# Patient Record
Sex: FEMALE | Race: BLACK OR AFRICAN AMERICAN | NOT HISPANIC OR LATINO | URBAN - METROPOLITAN AREA
[De-identification: names, ages, dates, MRNs, and addresses within clinical notes are randomized per-mention and may not be internally consistent; named-entity substitution may affect disease eponyms.]

---
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
## 2022-01-01 ENCOUNTER — INPATIENT (INPATIENT)
Facility: HOSPITAL | Age: 22
LOS: 10 days | Discharge: HOME | End: 2022-01-12
Attending: PSYCHIATRY & NEUROLOGY | Admitting: PSYCHIATRY & NEUROLOGY
Payer: COMMERCIAL

## 2022-01-01 VITALS — HEIGHT: 62 IN

## 2022-01-01 DIAGNOSIS — F31.9 BIPOLAR DISORDER, UNSPECIFIED: ICD-10-CM

## 2022-01-01 PROCEDURE — 93010 ELECTROCARDIOGRAM REPORT: CPT

## 2022-01-01 PROCEDURE — 99285 EMERGENCY DEPT VISIT HI MDM: CPT

## 2022-01-01 PROCEDURE — 99285 EMERGENCY DEPT VISIT HI MDM: CPT | Mod: 25

## 2022-01-01 PROCEDURE — 99053 MED SERV 10PM-8AM 24 HR FAC: CPT

## 2022-01-01 RX ORDER — HALOPERIDOL DECANOATE 100 MG/ML
5 INJECTION INTRAMUSCULAR ONCE
Refills: 0 | Status: COMPLETED | OUTPATIENT
Start: 2022-01-01 | End: 2022-01-01

## 2022-01-01 RX ORDER — DIPHENHYDRAMINE HCL 50 MG
50 CAPSULE ORAL EVERY 6 HOURS
Refills: 0 | Status: DISCONTINUED | OUTPATIENT
Start: 2022-01-02 | End: 2022-01-12

## 2022-01-01 RX ORDER — QUETIAPINE FUMARATE 200 MG/1
250 TABLET, FILM COATED ORAL AT BEDTIME
Refills: 0 | Status: DISCONTINUED | OUTPATIENT
Start: 2022-01-01 | End: 2022-01-02

## 2022-01-01 RX ORDER — QUETIAPINE FUMARATE 200 MG/1
250 TABLET, FILM COATED ORAL AT BEDTIME
Refills: 0 | Status: DISCONTINUED | OUTPATIENT
Start: 2022-01-02 | End: 2022-01-03

## 2022-01-01 RX ORDER — DIPHENHYDRAMINE HCL 50 MG
50 CAPSULE ORAL ONCE
Refills: 0 | Status: DISCONTINUED | OUTPATIENT
Start: 2022-01-02 | End: 2022-01-12

## 2022-01-01 RX ORDER — HALOPERIDOL DECANOATE 100 MG/ML
5 INJECTION INTRAMUSCULAR EVERY 6 HOURS
Refills: 0 | Status: DISCONTINUED | OUTPATIENT
Start: 2022-01-02 | End: 2022-01-12

## 2022-01-01 RX ORDER — HALOPERIDOL DECANOATE 100 MG/ML
5 INJECTION INTRAMUSCULAR ONCE
Refills: 0 | Status: DISCONTINUED | OUTPATIENT
Start: 2022-01-02 | End: 2022-01-12

## 2022-01-01 RX ADMIN — HALOPERIDOL DECANOATE 5 MILLIGRAM(S): 100 INJECTION INTRAMUSCULAR at 23:26

## 2022-01-01 RX ADMIN — Medication 50 MILLIGRAM(S): at 23:40

## 2022-01-01 RX ADMIN — Medication 2 MILLIGRAM(S): at 23:26

## 2022-01-01 NOTE — ED BEHAVIORAL HEALTH ASSESSMENT NOTE - PSYCHIATRIC ISSUES AND PLAN (INCLUDE STANDING AND PRN MEDICATION)
continue quetiapine 250 mg QHS, PRN haloperidol/lorazepam/diphenhydramine 5 mg / 2 mg / 50 mg PO/IM for
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
## 2022-01-01 NOTE — ED BEHAVIORAL HEALTH ASSESSMENT NOTE - DETAILS
boarding - will contact e-mailed Dr. Luther 1S anxiety/depression in family boarding Latuda "weight gain" and Zyprexa "I did not like it" Unable to assess, but has no history of suicidality. cortez family is notified
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
## 2022-01-01 NOTE — ED ADULT TRIAGE NOTE - CHIEF COMPLAINT QUOTE
Pt brought in by father for psych eval. Pt uncooperative in triage, wandering around RW and cursing at staff. Security called. Pt brought directly to .    Father: [phone]

## 2022-01-01 NOTE — ED BEHAVIORAL HEALTH ASSESSMENT NOTE - CASE SUMMARY
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    Patient is unable to participate in full interview. However, based on patient's clearly manic presentation (agitated, inappropriate, tangential, hypersexual) as well as collateral report (patient being aggressive and violent towards others, not taking her medications, disorganized), she meets criteria for involuntary hospitalization due to florid hemant and resulting inability to look after self and risk of harm to self and others.

## 2022-01-01 NOTE — ED BEHAVIORAL HEALTH ASSESSMENT NOTE - RISK ASSESSMENT
Low Acute Suicide Risk Protective factors: no history of suicide attempt, supportive family, engaged in school, connected to treatment. Risk factors: hemant, poor judgment, non adherence with medications, impulsivity

## 2022-01-01 NOTE — ED BEHAVIORAL HEALTH ASSESSMENT NOTE - SUMMARY
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    Patient is unable to participate in full interview. However, based on patient's clearly manic presentation (agitated, inappropriate, tangential, hypersexual) as well as collateral report (patient being aggressive and violent towards others, not taking her medications, disorganized), she meets criteria for involuntary hospitalization due to florid hemant and resulting inability to look after self and risk of harm to self and others. 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    Patient is unable to participate in full interview. However, based on patient's clearly manic presentation (agitated, inappropriate, tangential, hypersexual) as well as collateral report (patient being aggressive and violent towards others, not taking her medications, disorganized), she meets criteria for involuntary hospitalization due to florid hemant and resulting inability to look after self and risk of harm to self and others.    Addendum by Dr. Aaron: Pt tested positive for COVID , and was moved to the main ed . pt was seen this morning in the main ED, pt is  on 1:1, she awake and alert  , but is uncooperative but no longer agitated or aggressive . Pt is asking  to have "papers" signed . When asked about papers she states "to get out of here". Pt is observed to be disorganised, pulling things out of the draw, engaged in purposeless activity .  Pt is assigned 1N at [facility] , family is notified.

## 2022-01-01 NOTE — ED BEHAVIORAL HEALTH NOTE - BEHAVIORAL HEALTH NOTE
COVID Exposure Screen- collateral (i.e. third-party, chart review, belongings, etc; include EMS and ED staff)  1.	*Has the patient had a COVID-19 test in the last 90 days?  ( x ) Yes   (  ) No   (  ) Unknown- Reason: _____  IF YES PROCEED TO QUESTION #2. IF NO OR UNKNOWN, PLEASE SKIP TO QUESTION #3.  2.	Date of test(s) and result(s): 12/2/21/negative________  3.	*Has the patient tested positive for COVID-19 antibodies? (x  ) Yes   (  ) No   (  ) Unknown- Reason: _____  IF YES PROCEED TO QUESTION #4. IF NO or UNKNOWN, PLEASE SKIP TO QUESTION #5.  4.	Date of positive antibody test: _December 29_______  5.	*Has the patient received 2 doses of the COVID-19 vaccine? (  ) Yes   ( x ) No   (  ) Unknown- Reason: _____  IF YES PROCEED TO QUESTION #6. IF NO or UNKNOWN, PLEASE SKIP TO QUESTION #7.  6.	 Date of second dose: ________  7.	*In the past 10 days, has the patient been around anyone with a positive COVID-19 test?* (  ) Yes   ( x ) No   (  ) Unknown- Reason: __  IF YES PROCEED TO QUESTION #8. IF NO or UNKNOWN, PLEASE SKIP TO QUESTION #13.  8.	Was the patient within 6 feet of them for at least 15 minutes? (  ) Yes   (  ) No   (  ) Unknown- Reason: _____  9.	Did the patient provide care for them? (  ) Yes   (  ) No   (  ) Unknown- Reason: ______  10.	Did the patient have direct physical contact with them (touched, hugged, or kissed them)? (  ) Yes   (  ) No    (  ) Unknown- Reason: __  11.	Did the patient share eating or drinking utensils with them? (  ) Yes   (  ) No    (  ) Unknown- Reason: ____  12.	Did they sneeze, cough, or somehow get respiratory droplets on the patient? (  ) Yes   (  ) No    (  ) Unknown- Reason: ______  13.	*Has the patient been out of [place] within the past 10 days?* (  ) Yes   ( x ) No   (  ) Unknown- Reason: _____  IF YES PLEASE ANSWER THE FOLLOWING QUESTIONS:  14.	Which state/country did they go to? ______  15.	Were they there over 24 hours? (  ) Yes   (  ) No    (  ) Unknown- Reason: ______  16.	Date of return to [facility]: ______.

## 2022-01-01 NOTE — ED PROVIDER NOTE - COVID-19 RESULT DATE/TIME
29-Dec-2021 02:44 Mohs Method Verbiage: An incision at a 90 degree angle following the standard Mohs approach was done and the specimen was harvested as a microscopic controlled layer.

## 2022-01-01 NOTE — ED BEHAVIORAL HEALTH ASSESSMENT NOTE - OTHER PAST PSYCHIATRIC HISTORY (INCLUDE DETAILS REGARDING ONSET, COURSE OF ILLNESS, INPATIENT/OUTPATIENT TREATMENT)
two prior psych admissions in May 2020 in [place] for manic episode and psychosis, most recently at [facility] on 12/02/21-12/21/21 for hemant.  no past suicidal ideation, no past SA  Currently in treatment at [facility] - had intake. Next appt - Jan 7, 2022  recently seen in ED 12/29

## 2022-01-01 NOTE — ED BEHAVIORAL HEALTH ASSESSMENT NOTE - DESCRIPTION
breast reduction surgery in nursing school, resides with parents, in relationship with someone in [place] Vitals not currently available.  Patient was placed in restraints due to aggression and disorganization. Patient on constant observation in  area.

## 2022-01-01 NOTE — ED PROVIDER NOTE - NSICDXPASTSURGICALHX_GEN_ALL_CORE_FT
NURSE NOTES:

Dressing to the left foot remains intact.no complaint ofd pain.Piccline  double lumen to the 
upper left arm  intact.patient has no complaint of pain.call light within reach. PAST SURGICAL HISTORY:  No significant past surgical history

## 2022-01-01 NOTE — ED ADULT NURSE REASSESSMENT NOTE - NS ED NURSE REASSESS COMMENT FT1
Pt remains aggressive, combative, not cooperating. Pt is not re-directable. Pt placed in 4 point restraints per NP Pellow orders.

## 2022-01-01 NOTE — ED PROVIDER NOTE - CONSTITUTIONAL, MLM
2/22/2021      Vannessa Perez  [address]  [facility] [address]  1937        To Whom it may concern:           Mrs Perez is currently taking several over the counter medications including Miralax, fish oil, calcium, and CoQ10 on a daily basis.  She also monitors her blood sugars 5 times daily with a glucometer, test strips, and lancets.  Mrs Perez also uses incontinent pads on a daily basis.    Thank you    Sincerely:        Fito Galarza MD  [address]  [place] [address]  Phone: [phone]  Fax: [phone]   - - -

## 2022-01-01 NOTE — ED PROVIDER NOTE - CLINICAL SUMMARY MEDICAL DECISION MAKING FREE TEXT BOX
20 y/o  F  hx Bipolar   Labs, Urine Tox/UA, EKG  Medical evaluation performed. There is no clinical evidence of intoxication or any acute medical problem requiring immediate intervention. Patient is awaiting psychiatric consultation. Final disposition will be determined by psychiatrist.

## 2022-01-01 NOTE — ED PROVIDER NOTE - PROGRESS NOTE DETAILS
20 y/o  F  hx Bipolar BIB father secondary to aggression  and bizarre behaviors. Patient  appears paranoid,  presents  yelling , not responding to directions,   expressing a flight of ideas with excessive use of  profanity.  Medication offered. Restraint applied for safety. Constant  observation initiated. Psychiatric consultation called. Scar KUMAR: Pt signed out to me.  She received IM medications for severe agitation on arrival and was placed at the time in 4-point restraints.  Pt is now calm, sleeping comfortably, restraints removed, will cont to observe and reassess. Scar KUMAR: Labs reviewed, covid pcr is positive.  Will bring pt to main ED as she is boarding for appropriate psychiatric bed.

## 2022-01-02 DIAGNOSIS — F31.9 BIPOLAR DISORDER, UNSPECIFIED: ICD-10-CM

## 2022-01-02 LAB
ALBUMIN SERPL ELPH-MCNC: 3.7 G/DL — SIGNIFICANT CHANGE UP (ref 3.3–5)
ALP SERPL-CCNC: 82 U/L — SIGNIFICANT CHANGE UP (ref 40–120)
ALT FLD-CCNC: 15 U/L — SIGNIFICANT CHANGE UP (ref 4–33)
AMPHET UR-MCNC: NEGATIVE — SIGNIFICANT CHANGE UP
ANION GAP SERPL CALC-SCNC: 16 MMOL/L — HIGH (ref 7–14)
ANISOCYTOSIS BLD QL: SLIGHT — SIGNIFICANT CHANGE UP
APAP SERPL-MCNC: <5 UG/ML — LOW (ref 15–25)
APPEARANCE UR: ABNORMAL
AST SERPL-CCNC: 25 U/L — SIGNIFICANT CHANGE UP (ref 4–32)
BACTERIA # UR AUTO: ABNORMAL
BARBITURATES UR SCN-MCNC: NEGATIVE — SIGNIFICANT CHANGE UP
BASOPHILS # BLD AUTO: 0 K/UL — SIGNIFICANT CHANGE UP (ref 0–0.2)
BASOPHILS NFR BLD AUTO: 0 % — SIGNIFICANT CHANGE UP (ref 0–2)
BENZODIAZ UR-MCNC: NEGATIVE — SIGNIFICANT CHANGE UP
BILIRUB SERPL-MCNC: 0.3 MG/DL — SIGNIFICANT CHANGE UP (ref 0.2–1.2)
BILIRUB UR-MCNC: NEGATIVE — SIGNIFICANT CHANGE UP
BUN SERPL-MCNC: 13 MG/DL — SIGNIFICANT CHANGE UP (ref 7–23)
CALCIUM SERPL-MCNC: 8.9 MG/DL — SIGNIFICANT CHANGE UP (ref 8.4–10.5)
CHLORIDE SERPL-SCNC: 103 MMOL/L — SIGNIFICANT CHANGE UP (ref 98–107)
CO2 SERPL-SCNC: 18 MMOL/L — LOW (ref 22–31)
COCAINE METAB.OTHER UR-MCNC: NEGATIVE — SIGNIFICANT CHANGE UP
COLOR SPEC: YELLOW — SIGNIFICANT CHANGE UP
COVID-19 SPIKE DOMAIN AB INTERP: POSITIVE
COVID-19 SPIKE DOMAIN ANTIBODY RESULT: 183 U/ML — HIGH
CREAT SERPL-MCNC: 0.78 MG/DL — SIGNIFICANT CHANGE UP (ref 0.5–1.3)
CREATININE URINE RESULT, DAU: 253 MG/DL — SIGNIFICANT CHANGE UP
DIFF PNL FLD: NEGATIVE — SIGNIFICANT CHANGE UP
EOSINOPHIL # BLD AUTO: 0.05 K/UL — SIGNIFICANT CHANGE UP (ref 0–0.5)
EOSINOPHIL NFR BLD AUTO: 0.9 % — SIGNIFICANT CHANGE UP (ref 0–6)
EPI CELLS # UR: 11 /HPF — HIGH (ref 0–5)
ETHANOL SERPL-MCNC: <10 MG/DL — SIGNIFICANT CHANGE UP
GIANT PLATELETS BLD QL SMEAR: PRESENT — SIGNIFICANT CHANGE UP
GLUCOSE SERPL-MCNC: 82 MG/DL — SIGNIFICANT CHANGE UP (ref 70–99)
GLUCOSE UR QL: NEGATIVE — SIGNIFICANT CHANGE UP
HCG SERPL-ACNC: <5 MIU/ML — SIGNIFICANT CHANGE UP
HCT VFR BLD CALC: 36.7 % — SIGNIFICANT CHANGE UP (ref 34.5–45)
HGB BLD-MCNC: 11.3 G/DL — LOW (ref 11.5–15.5)
HYALINE CASTS # UR AUTO: 3 /LPF — SIGNIFICANT CHANGE UP (ref 0–7)
IANC: 1.43 K/UL — LOW (ref 1.5–8.5)
KETONES UR-MCNC: NEGATIVE — SIGNIFICANT CHANGE UP
LEUKOCYTE ESTERASE UR-ACNC: ABNORMAL
LYMPHOCYTES # BLD AUTO: 1.83 K/UL — SIGNIFICANT CHANGE UP (ref 1–3.3)
LYMPHOCYTES # BLD AUTO: 34.8 % — SIGNIFICANT CHANGE UP (ref 13–44)
MACROCYTES BLD QL: SLIGHT — SIGNIFICANT CHANGE UP
MANUAL SMEAR VERIFICATION: SIGNIFICANT CHANGE UP
MCHC RBC-ENTMCNC: 28.3 PG — SIGNIFICANT CHANGE UP (ref 27–34)
MCHC RBC-ENTMCNC: 30.8 GM/DL — LOW (ref 32–36)
MCV RBC AUTO: 92 FL — SIGNIFICANT CHANGE UP (ref 80–100)
METHADONE UR-MCNC: NEGATIVE — SIGNIFICANT CHANGE UP
MONOCYTES # BLD AUTO: 0.71 K/UL — SIGNIFICANT CHANGE UP (ref 0–0.9)
MONOCYTES NFR BLD AUTO: 13.4 % — SIGNIFICANT CHANGE UP (ref 2–14)
NEUTROPHILS # BLD AUTO: 2.02 K/UL — SIGNIFICANT CHANGE UP (ref 1.8–7.4)
NEUTROPHILS NFR BLD AUTO: 38.4 % — LOW (ref 43–77)
NITRITE UR-MCNC: NEGATIVE — SIGNIFICANT CHANGE UP
OPIATES UR-MCNC: NEGATIVE — SIGNIFICANT CHANGE UP
OXYCODONE UR-MCNC: NEGATIVE — SIGNIFICANT CHANGE UP
PCP SPEC-MCNC: SIGNIFICANT CHANGE UP
PCP UR-MCNC: NEGATIVE — SIGNIFICANT CHANGE UP
PH UR: 6.5 — SIGNIFICANT CHANGE UP (ref 5–8)
PLAT MORPH BLD: NORMAL — SIGNIFICANT CHANGE UP
PLATELET # BLD AUTO: 324 K/UL — SIGNIFICANT CHANGE UP (ref 150–400)
PLATELET COUNT - ESTIMATE: NORMAL — SIGNIFICANT CHANGE UP
POIKILOCYTOSIS BLD QL AUTO: SLIGHT — SIGNIFICANT CHANGE UP
POLYCHROMASIA BLD QL SMEAR: SLIGHT — SIGNIFICANT CHANGE UP
POTASSIUM SERPL-MCNC: 3.5 MMOL/L — SIGNIFICANT CHANGE UP (ref 3.5–5.3)
POTASSIUM SERPL-SCNC: 3.5 MMOL/L — SIGNIFICANT CHANGE UP (ref 3.5–5.3)
PROT SERPL-MCNC: 8.1 G/DL — SIGNIFICANT CHANGE UP (ref 6–8.3)
PROT UR-MCNC: ABNORMAL
RBC # BLD: 3.99 M/UL — SIGNIFICANT CHANGE UP (ref 3.8–5.2)
RBC # FLD: 14.1 % — SIGNIFICANT CHANGE UP (ref 10.3–14.5)
RBC BLD AUTO: ABNORMAL
RBC CASTS # UR COMP ASSIST: SIGNIFICANT CHANGE UP /HPF (ref 0–4)
SALICYLATES SERPL-MCNC: <0.3 MG/DL — LOW (ref 15–30)
SARS-COV-2 IGG+IGM SERPL QL IA: 183 U/ML — HIGH
SARS-COV-2 IGG+IGM SERPL QL IA: POSITIVE
SARS-COV-2 RNA SPEC QL NAA+PROBE: DETECTED
SMUDGE CELLS # BLD: PRESENT — SIGNIFICANT CHANGE UP
SODIUM SERPL-SCNC: 137 MMOL/L — SIGNIFICANT CHANGE UP (ref 135–145)
SP GR SPEC: 1.03 — SIGNIFICANT CHANGE UP (ref 1–1.05)
THC UR QL: POSITIVE
TOXICOLOGY SCREEN, DRUGS OF ABUSE, SERUM RESULT: SIGNIFICANT CHANGE UP
TSH SERPL-MCNC: 9.14 UIU/ML — HIGH (ref 0.5–4.3)
UROBILINOGEN FLD QL: SIGNIFICANT CHANGE UP
VARIANT LYMPHS # BLD: 12.5 % — HIGH (ref 0–6)
WBC # BLD: 5.27 K/UL — SIGNIFICANT CHANGE UP (ref 3.8–10.5)
WBC # FLD AUTO: 5.27 K/UL — SIGNIFICANT CHANGE UP (ref 3.8–10.5)
WBC UR QL: SIGNIFICANT CHANGE UP /HPF (ref 0–5)

## 2022-01-02 PROCEDURE — 99222 1ST HOSP IP/OBS MODERATE 55: CPT

## 2022-01-02 RX ORDER — DIPHENHYDRAMINE HCL 50 MG
50 CAPSULE ORAL ONCE
Refills: 0 | Status: COMPLETED | OUTPATIENT
Start: 2022-01-02 | End: 2022-01-01

## 2022-01-02 RX ORDER — HALOPERIDOL DECANOATE 100 MG/ML
5 INJECTION INTRAMUSCULAR ONCE
Refills: 0 | Status: COMPLETED | OUTPATIENT
Start: 2022-01-02 | End: 2022-01-02

## 2022-01-02 RX ORDER — ACETAMINOPHEN 500 MG
650 TABLET ORAL EVERY 6 HOURS
Refills: 0 | Status: DISCONTINUED | OUTPATIENT
Start: 2022-01-02 | End: 2022-01-12

## 2022-01-02 RX ADMIN — Medication 2 MILLIGRAM(S): at 09:03

## 2022-01-02 RX ADMIN — QUETIAPINE FUMARATE 250 MILLIGRAM(S): 200 TABLET, FILM COATED ORAL at 20:54

## 2022-01-02 RX ADMIN — HALOPERIDOL DECANOATE 5 MILLIGRAM(S): 100 INJECTION INTRAMUSCULAR at 09:03

## 2022-01-02 NOTE — BH INPATIENT PSYCHIATRY ASSESSMENT NOTE - NSBHASSESSSUMMFT_PSY_ALL_CORE
Bipolar Disorder 1, manic with psychosis    PLAN:  --Inpatient hospitalization for acute stabilization and treatment    --seroquel 250mg QHS and titrate as indicated / tolerated    --prn haldol, ativan, benadryl for agitation    --Individual, group, and milieu therapy as tolerated    --Ongoing collaboration with family and outpatient providers.

## 2022-01-02 NOTE — BH INPATIENT PSYCHIATRY ASSESSMENT NOTE - NSBHCHARTREVIEWVS_PSY_A_CORE FT
Vital Signs Last 24 Hrs  T(C): 37.1 (01-02-22 @ 12:00), Max: 37.1 (01-02-22 @ 12:00)  T(F): 98.8 (01-02-22 @ 12:00), Max: 98.8 (01-02-22 @ 12:00)  HR: 79 (01-02-22 @ 12:00) (79 - 90)  BP: 121/82 (01-02-22 @ 12:00) (121/82 - 137/74)  BP(mean): --  RR: 17 (01-02-22 @ 12:00) (17 - 18)  SpO2: 99% (01-02-22 @ 12:00) (99% - 100%)

## 2022-01-02 NOTE — BH INPATIENT PSYCHIATRY ASSESSMENT NOTE - OTHER PAST PSYCHIATRIC HISTORY (INCLUDE DETAILS REGARDING ONSET, COURSE OF ILLNESS, INPATIENT/OUTPATIENT TREATMENT)
two prior psych admissions in May 2020 in [place] for manic episode and psychosis, most recently at [facility] on 12/02/21-12/21/21 for hemant.  no past suicidal ideation, no past SA  Currently in treatment at [facility] - had intake. Next appt - Jan 7, 2022  recently seen in ED 12/29

## 2022-01-02 NOTE — BH PATIENT PROFILE - FALL HARM RISK - UNIVERSAL INTERVENTIONS
Bed in lowest position, wheels locked, appropriate side rails in place/Call bell, personal items and telephone in reach/Instruct patient to call for assistance before getting out of bed or chair/Non-slip footwear when patient is out of bed/[place] to call system/Physically safe environment - no spills, clutter or unnecessary equipment/Purposeful Proactive Rounding/Room/bathroom lighting operational, light cord in reach

## 2022-01-02 NOTE — BH INPATIENT PSYCHIATRY ASSESSMENT NOTE - CURRENT MEDICATION
MEDICATIONS  (STANDING):  QUEtiapine 250 milliGRAM(s) Oral at bedtime    MEDICATIONS  (PRN):  acetaminophen     Tablet .. 650 milliGRAM(s) Oral every 6 hours PRN Temp greater or equal to 38C (100.4F), Mild Pain (1 - 3)  diphenhydrAMINE 50 milliGRAM(s) Oral every 6 hours PRN Rash and/or Itching, agitation  diphenhydrAMINE Injectable 50 milliGRAM(s) IntraMuscular once PRN agitation  haloperidol     Tablet 5 milliGRAM(s) Oral every 6 hours PRN agitation  haloperidol    Injectable 5 milliGRAM(s) IntraMuscular Once PRN agitation  LORazepam     Tablet 2 milliGRAM(s) Oral every 6 hours PRN Agitation  LORazepam   Injectable 2 milliGRAM(s) IntraMuscular Once PRN Agitation

## 2022-01-02 NOTE — ED ADULT NURSE NOTE - DOES PATIENT HAVE ADVANCE DIRECTIVE
Pt received into bay 6 from OR. Pt with oral airway, non responsive to verbal stimuli. O2 at 6L/simple mask. resp easy, unlabored. Report obtained. Right foot foot dressing with Ace bandage. Clean/dry/intact. Elevated with pillow. Ice pack applied to site. Pt appears comfortable. Will closely monitor at bedside while oral airway in place. No

## 2022-01-02 NOTE — ED ADULT NURSE NOTE - CHIEF COMPLAINT QUOTE
Pt brought in by father for psych eval. Pt uncooperative in triage, wandering around RW and cursing at staff. Security called. Pt brought directly to .    Father: [phone]

## 2022-01-02 NOTE — BH INPATIENT PSYCHIATRY ASSESSMENT NOTE - DETAILS
Latuda "weight gain" and Zyprexa "I did not like it" Unable to assess, but has no history of suicidality. anxiety/depression in family

## 2022-01-02 NOTE — BH INPATIENT PSYCHIATRY ASSESSMENT NOTE - NSBHMETABOLIC_PSY_ALL_CORE_FT
BMI: BMI (kg/m2): 29.6 (01-01-22 @ 22:50)  HbA1c:   Glucose:   BP: 121/82 (01-02-22 @ 12:00) (121/82 - 137/74)  Lipid Panel:

## 2022-01-02 NOTE — ED ADULT NURSE REASSESSMENT NOTE - NS ED NURSE REASSESS COMMENT FT1
Report given to red 2 rn. Pt moved to main room 5. Respirations even and unlabored, no accessory muscle use.
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
## 2022-01-02 NOTE — BH INPATIENT PSYCHIATRY ASSESSMENT NOTE - MSE UNSTRUCTURED FT
Patient is sedated, slurring words, sluggish, struggling to keep eyes open, unable to engage in meaningful conversation, states she feels "fine" and denies SI/HI/AVH.  I&J poor.

## 2022-01-02 NOTE — BH PATIENT PROFILE - HOME MEDICATIONS
SEROquel 100 mg oral tablet , 1 tab(s) orally once a day (at bedtime) MDD:250mg  QUEtiapine 50 mg oral tablet , 3 tab(s) orally once a day (at bedtime)  Multiple Vitamins oral tablet , 1 tab(s) orally once a day

## 2022-01-02 NOTE — ED ADULT NURSE REASSESSMENT NOTE - NS ED NURSE REASSESS COMMENT FT1
[facility] here to take patient, pt transferred w. her belongings. Paperwork given to EMS. [facility] here to take patient, pt transferred w. her belongings. Paperwork given to EMS. VS stable at time of discharge.

## 2022-01-02 NOTE — ED ADULT NURSE REASSESSMENT NOTE - NS ED NURSE REASSESS COMMENT FT1
Pt released from 4 point restraints at this time. Respirations even and unlabored, no accessory muscle use. Skin intact, no injuries noted.

## 2022-01-02 NOTE — ED ADULT NURSE REASSESSMENT NOTE - NS ED NURSE REASSESS COMMENT FT1
Pt sleeping in bed. Respirations even and unlabored, no accessory muscle use. NAD noted at this time. Will continue to monitor.
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
## 2022-01-03 PROCEDURE — 99232 SBSQ HOSP IP/OBS MODERATE 35: CPT

## 2022-01-03 RX ORDER — NICOTINE POLACRILEX 2 MG
2 GUM BUCCAL
Refills: 0 | Status: DISCONTINUED | OUTPATIENT
Start: 2022-01-03 | End: 2022-01-03

## 2022-01-03 RX ORDER — NICOTINE POLACRILEX 2 MG
2 GUM BUCCAL
Refills: 0 | Status: DISCONTINUED | OUTPATIENT
Start: 2022-01-03 | End: 2022-01-12

## 2022-01-03 RX ORDER — LITHIUM CARBONATE 300 MG/1
450 TABLET, EXTENDED RELEASE ORAL AT BEDTIME
Refills: 0 | Status: DISCONTINUED | OUTPATIENT
Start: 2022-01-03 | End: 2022-01-04

## 2022-01-03 RX ORDER — QUETIAPINE FUMARATE 200 MG/1
300 TABLET, FILM COATED ORAL AT BEDTIME
Refills: 0 | Status: DISCONTINUED | OUTPATIENT
Start: 2022-01-03 | End: 2022-01-05

## 2022-01-03 RX ADMIN — Medication 650 MILLIGRAM(S): at 20:46

## 2022-01-03 RX ADMIN — Medication 650 MILLIGRAM(S): at 21:37

## 2022-01-03 RX ADMIN — Medication 2 MILLIGRAM(S): at 12:44

## 2022-01-03 RX ADMIN — QUETIAPINE FUMARATE 300 MILLIGRAM(S): 200 TABLET, FILM COATED ORAL at 20:46

## 2022-01-03 RX ADMIN — Medication 1 TABLET(S): at 11:22

## 2022-01-03 RX ADMIN — Medication 50 MILLIGRAM(S): at 21:59

## 2022-01-03 NOTE — PSYCHIATRIC REHAB INITIAL EVALUATION - NSBHALCSUBTREAT_PSY_ALL_CORE
Pt has psychiatric history of hemant and psychosis with 3 hospitalizations, last hospitalization was at [facility] 12/2-12/21.

## 2022-01-03 NOTE — BH INPATIENT PSYCHIATRY PROGRESS NOTE - NSBHASSESSSUMMFT_PSY_ALL_CORE
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   Bipolar Disorder 1, manic with psychosis  Currently grandiose, distractible, increased energy, psychomotor agitation, intrusive and disruptive on the unit, poor insight into illness. Seroquel 150mg had been well tolerated on prior admission, will plan to increase dose.  Continued inpatient admission required for safety, stabilization, medication optimization, safe discharge planning.    PLAN:    1. Legal: Admitted on 9.39 status  2. Safety: No reported SI/SIB/HI/VI currently on unit; continue routine observation.   - PRNs: ativan 2mg PO/IM qh6, haldol 5mg PO/IM q6h, benadryl 50 PO/IM q6h all PRN for agitation  3. Psychiatric:   - INCREASE seroquel to 300mg PO qhs --> plan to titrate up as tolerated  - START Lithium 450mg PO qhs --> plan to titrate up  4. Therapy: group & milieu therapy  5. Medical: Obtain metabolic labs given pt on antipsychotics: A1C with glucose, lipid panel  6. Collateral: Collateral from father obtained c/w manic episode  7. Disposition: When stable (include possible disposition plans when known)
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
## 2022-01-03 NOTE — PSYCHIATRIC REHAB INITIAL EVALUATION - NSBHPRRECOMMEND_PSY_ALL_CORE
Writer met with patient in order to orient patient to unit, orient patient to COVID-19 precautions, and introduce patient to psychiatric rehabilitation staff and department functions. Due to severity of patient’s sxs, pt was minimally engaged. Pt was irritable and disorganized upon approach. Pt was observed singing and talking loudly on the unit. Therefore, the admission information was obtained from patient’s chart. Pt was brought to ED by her parents for manic episode. Pt was aggressive towards family members and refused taking medications at home. Pt was sexually provocative, agitated and received IM in ED.  Writer and patient were unable to establish a collaborative psychiatric rehabilitation goal, therefore one will be chosen for her. Psychiatric Rehabilitation staff will continue to engage patient daily in order to develop therapeutic rapport. In response to COVID19, unit programming will be re-evaluated on a consistent basis in effort to maintain safety guidelines.

## 2022-01-04 LAB
A1C WITH ESTIMATED AVERAGE GLUCOSE RESULT: 5.3 % — SIGNIFICANT CHANGE UP (ref 4–5.6)
CHOLEST SERPL-MCNC: 142 MG/DL — SIGNIFICANT CHANGE UP
ESTIMATED AVERAGE GLUCOSE: 105 — SIGNIFICANT CHANGE UP
HDLC SERPL-MCNC: 51 MG/DL — SIGNIFICANT CHANGE UP
LIPID PNL WITH DIRECT LDL SERPL: 79 MG/DL — SIGNIFICANT CHANGE UP
NON HDL CHOLESTEROL: 91 MG/DL — SIGNIFICANT CHANGE UP
TRIGL SERPL-MCNC: 61 MG/DL — SIGNIFICANT CHANGE UP

## 2022-01-04 PROCEDURE — 99232 SBSQ HOSP IP/OBS MODERATE 35: CPT

## 2022-01-04 RX ORDER — LORATADINE 10 MG/1
10 TABLET ORAL DAILY
Refills: 0 | Status: DISCONTINUED | OUTPATIENT
Start: 2022-01-04 | End: 2022-01-12

## 2022-01-04 RX ORDER — LIDOCAINE 4 G/100G
1 CREAM TOPICAL ONCE
Refills: 0 | Status: COMPLETED | OUTPATIENT
Start: 2022-01-04 | End: 2022-01-04

## 2022-01-04 RX ORDER — SODIUM CHLORIDE 0.65 %
1 AEROSOL, SPRAY (ML) NASAL
Refills: 0 | Status: DISCONTINUED | OUTPATIENT
Start: 2022-01-04 | End: 2022-01-12

## 2022-01-04 RX ORDER — BENZOCAINE AND MENTHOL 5; 1 G/100ML; G/100ML
1 LIQUID ORAL
Refills: 0 | Status: DISCONTINUED | OUTPATIENT
Start: 2022-01-04 | End: 2022-01-12

## 2022-01-04 RX ORDER — MEN-PHOR .5; .5 G/G; G/G
1 LOTION TOPICAL ONCE
Refills: 0 | Status: DISCONTINUED | OUTPATIENT
Start: 2022-01-04 | End: 2022-01-04

## 2022-01-04 RX ORDER — TETRAHYDROZOLINE/POLYETHYL GLY 0.05 %-1 %
1 DROPS OPHTHALMIC (EYE)
Refills: 0 | Status: DISCONTINUED | OUTPATIENT
Start: 2022-01-04 | End: 2022-01-12

## 2022-01-04 RX ADMIN — Medication 50 MILLIGRAM(S): at 09:38

## 2022-01-04 RX ADMIN — Medication 650 MILLIGRAM(S): at 14:56

## 2022-01-04 RX ADMIN — BENZOCAINE AND MENTHOL 1 LOZENGE: 5; 1 LIQUID ORAL at 14:56

## 2022-01-04 RX ADMIN — Medication 1 DROP(S): at 17:41

## 2022-01-04 RX ADMIN — LIDOCAINE 1 PATCH: 4 CREAM TOPICAL at 18:24

## 2022-01-04 RX ADMIN — Medication 1 SPRAY(S): at 17:41

## 2022-01-04 RX ADMIN — Medication 1 TABLET(S): at 08:24

## 2022-01-04 RX ADMIN — LORATADINE 10 MILLIGRAM(S): 10 TABLET ORAL at 17:41

## 2022-01-04 RX ADMIN — Medication 2 MILLIGRAM(S): at 09:31

## 2022-01-04 RX ADMIN — Medication 2 MILLIGRAM(S): at 10:49

## 2022-01-04 RX ADMIN — HALOPERIDOL DECANOATE 5 MILLIGRAM(S): 100 INJECTION INTRAMUSCULAR at 10:48

## 2022-01-04 RX ADMIN — QUETIAPINE FUMARATE 300 MILLIGRAM(S): 200 TABLET, FILM COATED ORAL at 21:15

## 2022-01-04 RX ADMIN — Medication 2 MILLIGRAM(S): at 14:56

## 2022-01-04 NOTE — BH INPATIENT PSYCHIATRY PROGRESS NOTE - NSBHFUPINTERVALHXFT_PSY_A_CORE
Chart reviewed. Case discussed with interdisciplinary team. Patient seen and examined for follow up of hemant. No acute overnight events. Refused lithium last night. Required PRN ativan and haldol PO this morning for agitation. Per nursing she is irritable, demanding, but quieter than yesterday.     Patient seen this morning in day room by writer. Pt is irritable, hostile, guarded. States she is in the hospital because "my father drove me here and he is incompetent and needs help himself." States she has mood swings "but that's just me." She is overly inclusive and difficult to interrupt, interrupts interviewers. States she is "not a dog and not a lab rat" and that she will not take lithium. States that she is stable and does not need hospitalization, "it's just that I have cats around me holding on like I'm a rag doll." She becomes increasingly oppositional during interview, stating "I'm coming to the point where I want to curse you out."    Denies SI or HI. Denies AVH.

## 2022-01-04 NOTE — BH INPATIENT PSYCHIATRY PROGRESS NOTE - NSBHASSESSSUMMFT_PSY_ALL_CORE
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   Bipolar Disorder 1, manic with psychosis  Currently grandiose, distractible, increased energy, psychomotor agitation, intrusive and disruptive on the unit, poor insight into illness. Seroquel 150mg had been well tolerated on prior admission, will plan to increase dose.  Continued inpatient admission required for safety, stabilization, medication optimization, safe discharge planning.    1/4: Manic symptoms persist, irritable and grandiose, oppositional, pressured, interrupting denying that she is in a manic episode or that she requires hospitalization. Refuses lithium, will dc.     PLAN:    1. Legal: Admitted on 9.39 status  2. Safety: No reported SI/SIB/HI/VI currently on unit; continue routine observation.   - PRNs: ativan 2mg PO/IM qh6, haldol 5mg PO/IM q6h, benadryl 50 PO/IM q6h all PRN for agitation  3. Psychiatric:   - Cotin[place] seroquel 300mg PO qhs --> plan to titrate up as tolerated  - STOP Lithium --> patient refuses  4. Therapy: group & milieu therapy  5. Medical: no current medical issues.  6. Collateral: Collateral from father obtained c/w manic episode  7. Disposition: When stable (include possible disposition plans when known)

## 2022-01-05 PROCEDURE — 99232 SBSQ HOSP IP/OBS MODERATE 35: CPT

## 2022-01-05 RX ORDER — QUETIAPINE FUMARATE 200 MG/1
400 TABLET, FILM COATED ORAL AT BEDTIME
Refills: 0 | Status: DISCONTINUED | OUTPATIENT
Start: 2022-01-05 | End: 2022-01-07

## 2022-01-05 RX ORDER — LIDOCAINE 4 G/100G
1 CREAM TOPICAL DAILY
Refills: 0 | Status: DISCONTINUED | OUTPATIENT
Start: 2022-01-05 | End: 2022-01-12

## 2022-01-05 RX ADMIN — Medication 1 DROP(S): at 17:20

## 2022-01-05 RX ADMIN — HALOPERIDOL DECANOATE 5 MILLIGRAM(S): 100 INJECTION INTRAMUSCULAR at 09:39

## 2022-01-05 RX ADMIN — Medication 2 MILLIGRAM(S): at 10:31

## 2022-01-05 RX ADMIN — Medication 2 MILLIGRAM(S): at 09:39

## 2022-01-05 RX ADMIN — Medication 1 SPRAY(S): at 08:39

## 2022-01-05 RX ADMIN — Medication 50 MILLIGRAM(S): at 17:38

## 2022-01-05 RX ADMIN — LIDOCAINE 1 PATCH: 4 CREAM TOPICAL at 11:34

## 2022-01-05 RX ADMIN — Medication 1 TABLET(S): at 08:39

## 2022-01-05 RX ADMIN — QUETIAPINE FUMARATE 400 MILLIGRAM(S): 200 TABLET, FILM COATED ORAL at 20:51

## 2022-01-05 RX ADMIN — Medication 1 DROP(S): at 08:39

## 2022-01-05 RX ADMIN — Medication 650 MILLIGRAM(S): at 09:38

## 2022-01-05 RX ADMIN — Medication 1 SPRAY(S): at 17:20

## 2022-01-05 RX ADMIN — LORATADINE 10 MILLIGRAM(S): 10 TABLET ORAL at 08:39

## 2022-01-05 NOTE — BH INPATIENT PSYCHIATRY PROGRESS NOTE - NSBHASSESSSUMMFT_PSY_ALL_CORE
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   Bipolar Disorder 1, manic with psychosis  Currently grandiose, distractible, increased energy, psychomotor agitation, intrusive and disruptive on the unit, poor insight into illness. Seroquel 150mg had been well tolerated on prior admission, will plan to increase dose.  Continued inpatient admission required for safety, stabilization, medication optimization, safe discharge planning.    1/4: Manic symptoms persist, irritable and grandiose, oppositional, pressured, interrupting denying that she is in a manic episode or that she requires hospitalization. Refuses lithium, will dc.   1/5 remains floridly manic  PLAN:    1. Legal: Admitted on 9.39 status  2. Safety: No reported SI/SIB/HI/VI currently on unit; continue routine observation.   - PRNs: ativan 2mg PO/IM qh6, haldol 5mg PO/IM q6h, benadryl 50 PO/IM q6h all PRN for agitation  3. Psychiatric:   - Cotinue seroquel increase to 400mg PO qhs --> plan to titrate up as tolerated  - STOP Lithium --> patient refuses  4. Therapy: group & milieu therapy  5. Medical: no current medical issues.  6. Collateral: Collateral from father obtained c/w manic episode  7. Disposition: When stable (include possible disposition plans when known)

## 2022-01-05 NOTE — BH INPATIENT PSYCHIATRY PROGRESS NOTE - NSBHFUPINTERVALHXFT_PSY_A_CORE
Patient seen for follow up of hemant with psychosis  Chart reviewed and case discussed with interdisciplinary team.  Remains manic with  irritability, hostility and paranoia  "I'm here because of a [place] conspiracy to make Covid patients to hide the opiate epidemic"  She continues with flight of ideas  Leaves interview room when attempt is made to clarify her symptoms

## 2022-01-06 PROCEDURE — 99232 SBSQ HOSP IP/OBS MODERATE 35: CPT

## 2022-01-06 RX ORDER — IBUPROFEN 200 MG
600 TABLET ORAL EVERY 6 HOURS
Refills: 0 | Status: DISCONTINUED | OUTPATIENT
Start: 2022-01-06 | End: 2022-01-12

## 2022-01-06 RX ADMIN — QUETIAPINE FUMARATE 400 MILLIGRAM(S): 200 TABLET, FILM COATED ORAL at 23:12

## 2022-01-06 RX ADMIN — LIDOCAINE 1 PATCH: 4 CREAM TOPICAL at 23:37

## 2022-01-06 RX ADMIN — Medication 1 SPRAY(S): at 23:39

## 2022-01-06 RX ADMIN — HALOPERIDOL DECANOATE 5 MILLIGRAM(S): 100 INJECTION INTRAMUSCULAR at 09:05

## 2022-01-06 RX ADMIN — Medication 2 MILLIGRAM(S): at 09:05

## 2022-01-06 RX ADMIN — Medication 600 MILLIGRAM(S): at 14:16

## 2022-01-06 RX ADMIN — Medication 1 TABLET(S): at 09:04

## 2022-01-06 NOTE — BH TREATMENT PLAN - NSTXMEDICINTERPR_PSY_ALL_CORE
Per EMR, pt with continued elevated FiO2 and not appropriate for PT per rehab protocol. Per RN - recommend no PT evaluation until Monday 7/1/19 if appropriate. Will continue to follow pt. Patient will benefit from demonstrating medication compliance and engaging in individual and group sessions in order to identify benefits of compliance for improved symptom management and sustained recovery by day seven. Psychiatric Rehabilitation staff will engage patient daily in order to assist patient in exploring techniques to ensure better compliance.

## 2022-01-06 NOTE — CHART NOTE - NSCHARTNOTEFT_GEN_A_CORE
Asked to examine patient for surgical wound drainage. Patient reports receiving breast reduction surgery 8 weeks prior. Since that time has intermitted drainage from surgical wound. No nipple discharge. Describes the discharge as brownish, no purulence, no bright red blood. On examination w/ supervision by [facility] surgical wound healing w/o dehiscence, purulence, bleeding. Recommend pain control w/ tylenol 975 mg q6 hrs as needed and follow up with outpatient plastic surgeon on discharge.

## 2022-01-06 NOTE — BH TREATMENT PLAN - NSTXMEDICINTERSW_PSY_ALL_CORE
SW reviewed EMR and met with team to gain collateral information regarding pt . SW attempted to meet with pt, pt unwilling to meet, no consent given.

## 2022-01-06 NOTE — BH INPATIENT PSYCHIATRY PROGRESS NOTE - NSBHFUPINTERVALHXFT_PSY_A_CORE
Chart reviewed. Case discussed with interdisciplinary team. Patient seen and examined for follow up of hemant. No acute overnight events. Compliant with standing medication. Used PRN haloperidol, diphenhydramine, and lorazepam for agitation yesterday, also multiple medical PRNs: cepacol, loratidine, acetominaophen, lidocaine, eye drops, nasal spray. Per sleep log, fair sleep.     Patient seen this morning in day room by writer and treatment team. Pt reports that she feels "fine" and requests discharge. Reports speaking to family who feel she is doing better, patient agrees and attributes improvement to medication. Reports fair sleep. Somewhat guarded, answers most questions briefly, though intermittently interrupts interviewers. 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    Denies AVH. Denies SI/HI.    Patient reports BL breast pain and bloody discharge s/p breast reduction surgery several weeks ago. Reports concern for developing UTI due to being on her menstrual cycle, denies dysuria. Agreeable to being seen by hospitalist consultant.    Per collateral from sister with consent from patient:  Affirms that patient has been very manic at home, family cannot manage, sister in support of admission until symptoms improved.  First manic episode patient was psychotic and disorganized.  Sister affirms she is patient's the legal guardian.  Med History:  after first hospitalization, was put on zyprexa, which she thought made her thinking slow and speech slurred  then switched to abilify, but she became depressed and didn't do well in school  then switch to latuda and prozac, "latuda was the best medication, she was her normal self" but patient dc'd due to side effect of nausea, though sister thinks element of rejecting diagnosis also contributed  then prozac alone was continued until manic episode nov. 2021

## 2022-01-06 NOTE — BH INPATIENT PSYCHIATRY PROGRESS NOTE - NSBHASSESSSUMMFT_PSY_ALL_CORE
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   Bipolar Disorder 1, manic with psychosis  Currently grandiose, distractible, increased energy, psychomotor agitation, intrusive and disruptive on the unit, poor insight into illness. Seroquel 150mg had been well tolerated on prior admission, will plan to increase dose.  Continued inpatient admission required for safety, stabilization, medication optimization, safe discharge planning.    1/4: Manic symptoms persist, irritable and grandiose, oppositional, pressured, interrupting denying that she is in a manic episode or that she requires hospitalization. Refuses lithium, will dc.   1/5 remains floridly manic  1/6: Manic symptoms significantly improved. Patient no longer hyperverbal, less irritable. Willing to engage with interview and discuss medicationoptions. Will continue quetiapine.    PLAN:  1. Legal: Admitted on 9.39 status  2. Safety: No reported SI/SIB/HI/VI currently on unit; continue routine observation.   - PRNs: ativan 2mg PO/IM qh6, haldol 5mg PO/IM q6h, benadryl 50 PO/IM q6h all PRN for agitation  3. Psychiatric:   - Cotinue seroquel increase to 400mg PO qhs --> plan to titrate up as tolerated  - STOP Lithium --> patient refuses  4. Therapy: group & milieu therapy  5. Medical: no current medical issues.  6. Collateral: Collateral from father obtained c/w manic episode  7. Disposition: When stable (include possible disposition plans when known)

## 2022-01-07 DIAGNOSIS — U07.1 COVID-19: ICD-10-CM

## 2022-01-07 DIAGNOSIS — Z98.890 OTHER SPECIFIED POSTPROCEDURAL STATES: ICD-10-CM

## 2022-01-07 LAB
APPEARANCE UR: CLEAR — SIGNIFICANT CHANGE UP
BACTERIA # UR AUTO: NEGATIVE — SIGNIFICANT CHANGE UP
BILIRUB UR-MCNC: NEGATIVE — SIGNIFICANT CHANGE UP
COLOR SPEC: YELLOW — SIGNIFICANT CHANGE UP
DIFF PNL FLD: NEGATIVE — SIGNIFICANT CHANGE UP
EPI CELLS # UR: 3 /HPF — SIGNIFICANT CHANGE UP (ref 0–5)
GLUCOSE UR QL: NEGATIVE — SIGNIFICANT CHANGE UP
HYALINE CASTS # UR AUTO: 0 /LPF — SIGNIFICANT CHANGE UP (ref 0–7)
KETONES UR-MCNC: NEGATIVE — SIGNIFICANT CHANGE UP
LEUKOCYTE ESTERASE UR-ACNC: ABNORMAL
NITRITE UR-MCNC: NEGATIVE — SIGNIFICANT CHANGE UP
PH UR: 7.5 — SIGNIFICANT CHANGE UP (ref 5–8)
PROT UR-MCNC: ABNORMAL
RBC CASTS # UR COMP ASSIST: 1 /HPF — SIGNIFICANT CHANGE UP (ref 0–4)
SP GR SPEC: 1.02 — SIGNIFICANT CHANGE UP (ref 1–1.05)
UROBILINOGEN FLD QL: SIGNIFICANT CHANGE UP
WBC UR QL: 3 /HPF — SIGNIFICANT CHANGE UP (ref 0–5)

## 2022-01-07 PROCEDURE — 99232 SBSQ HOSP IP/OBS MODERATE 35: CPT

## 2022-01-07 RX ORDER — QUETIAPINE FUMARATE 200 MG/1
500 TABLET, FILM COATED ORAL AT BEDTIME
Refills: 0 | Status: DISCONTINUED | OUTPATIENT
Start: 2022-01-07 | End: 2022-01-12

## 2022-01-07 RX ADMIN — Medication 50 MILLIGRAM(S): at 21:44

## 2022-01-07 RX ADMIN — LORATADINE 10 MILLIGRAM(S): 10 TABLET ORAL at 10:06

## 2022-01-07 RX ADMIN — QUETIAPINE FUMARATE 500 MILLIGRAM(S): 200 TABLET, FILM COATED ORAL at 20:43

## 2022-01-07 RX ADMIN — HALOPERIDOL DECANOATE 5 MILLIGRAM(S): 100 INJECTION INTRAMUSCULAR at 10:05

## 2022-01-07 RX ADMIN — BENZOCAINE AND MENTHOL 1 LOZENGE: 5; 1 LIQUID ORAL at 21:02

## 2022-01-07 RX ADMIN — BENZOCAINE AND MENTHOL 1 LOZENGE: 5; 1 LIQUID ORAL at 10:06

## 2022-01-07 RX ADMIN — Medication 1 TABLET(S): at 08:38

## 2022-01-07 RX ADMIN — Medication 2 MILLIGRAM(S): at 21:03

## 2022-01-07 RX ADMIN — Medication 650 MILLIGRAM(S): at 13:50

## 2022-01-07 RX ADMIN — Medication 2 MILLIGRAM(S): at 10:06

## 2022-01-07 NOTE — BH INPATIENT PSYCHIATRY PROGRESS NOTE - NSBHFUPINTERVALHXFT_PSY_A_CORE
Patient seen for follow up of Bipolar Rach   Chart reviewed and case discussed with interdisciplinary team.  Compliant with standing medication.  Reports feeling that she is improving  Focused on discharge and refusing PHP  Her family, her team here and her prior team at [facility] all feel she needs PHP  Patient becomes irritable and ends the meeting early

## 2022-01-07 NOTE — BH INPATIENT PSYCHIATRY PROGRESS NOTE - NSBHASSESSSUMMFT_PSY_ALL_CORE
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   Bipolar Disorder 1, manic with psychosis  Currently grandiose, distractible, increased energy, psychomotor agitation, intrusive and disruptive on the unit, poor insight into illness. Seroquel 150mg had been well tolerated on prior admission, will plan to increase dose.  Continued inpatient admission required for safety, stabilization, medication optimization, safe discharge planning.    1/4: Manic symptoms persist, irritable and grandiose, oppositional, pressured, interrupting denying that she is in a manic episode or that she requires hospitalization. Refuses lithium, will dc.   1/5 remains floridly manic  1/6: Manic symptoms significantly improved. Patient no longer hyperverbal, less irritable. Willing to engage with interview and discuss medication options. Will continue quetiapine.   1/7 More manic again after some improvement yesterday  PLAN:  1. Legal: Admitted on 9.39 status  2. Safety: No reported SI/SIB/HI/VI currently on unit; continue routine observation.   - PRNs: ativan 2mg PO/IM qh6, haldol 5mg PO/IM q6h, benadryl 50 PO/IM q6h all PRN for agitation  3. Psychiatric:   - Cotinue seroquel increase to 500 mg PO qhs --> plan to titrate up as tolerated  - STOP Lithium --> patient refuses  4. Therapy: group & milieu therapy  5. Medical: no current medical issues.  6. Collateral: Collateral from father obtained c/w manic episode  7. Disposition: When stable (include possible disposition plans when known)

## 2022-01-08 RX ADMIN — QUETIAPINE FUMARATE 500 MILLIGRAM(S): 200 TABLET, FILM COATED ORAL at 20:48

## 2022-01-08 RX ADMIN — Medication 2 MILLIGRAM(S): at 09:18

## 2022-01-08 RX ADMIN — Medication 50 MILLIGRAM(S): at 22:37

## 2022-01-08 RX ADMIN — HALOPERIDOL DECANOATE 5 MILLIGRAM(S): 100 INJECTION INTRAMUSCULAR at 09:19

## 2022-01-08 RX ADMIN — Medication 2 MILLIGRAM(S): at 08:30

## 2022-01-08 RX ADMIN — Medication 50 MILLIGRAM(S): at 09:19

## 2022-01-08 RX ADMIN — Medication 1 TABLET(S): at 08:30

## 2022-01-08 RX ADMIN — BENZOCAINE AND MENTHOL 1 LOZENGE: 5; 1 LIQUID ORAL at 06:46

## 2022-01-08 RX ADMIN — LORATADINE 10 MILLIGRAM(S): 10 TABLET ORAL at 08:30

## 2022-01-09 RX ADMIN — LIDOCAINE 1 PATCH: 4 CREAM TOPICAL at 21:39

## 2022-01-09 RX ADMIN — HALOPERIDOL DECANOATE 5 MILLIGRAM(S): 100 INJECTION INTRAMUSCULAR at 11:01

## 2022-01-09 RX ADMIN — Medication 650 MILLIGRAM(S): at 11:00

## 2022-01-09 RX ADMIN — LORATADINE 10 MILLIGRAM(S): 10 TABLET ORAL at 10:59

## 2022-01-09 RX ADMIN — BENZOCAINE AND MENTHOL 1 LOZENGE: 5; 1 LIQUID ORAL at 10:59

## 2022-01-09 RX ADMIN — Medication 2 MILLIGRAM(S): at 10:59

## 2022-01-09 RX ADMIN — Medication 1 TABLET(S): at 10:59

## 2022-01-09 RX ADMIN — QUETIAPINE FUMARATE 500 MILLIGRAM(S): 200 TABLET, FILM COATED ORAL at 21:38

## 2022-01-09 RX ADMIN — Medication 2 MILLIGRAM(S): at 11:01

## 2022-01-09 RX ADMIN — Medication 50 MILLIGRAM(S): at 11:00

## 2022-01-09 RX ADMIN — Medication 50 MILLIGRAM(S): at 21:48

## 2022-01-10 PROCEDURE — 99231 SBSQ HOSP IP/OBS SF/LOW 25: CPT

## 2022-01-10 RX ADMIN — Medication 600 MILLIGRAM(S): at 00:29

## 2022-01-10 RX ADMIN — Medication 1 TABLET(S): at 08:30

## 2022-01-10 RX ADMIN — BENZOCAINE AND MENTHOL 1 LOZENGE: 5; 1 LIQUID ORAL at 08:30

## 2022-01-10 RX ADMIN — Medication 50 MILLIGRAM(S): at 12:47

## 2022-01-10 RX ADMIN — Medication 600 MILLIGRAM(S): at 00:05

## 2022-01-10 RX ADMIN — BENZOCAINE AND MENTHOL 1 LOZENGE: 5; 1 LIQUID ORAL at 21:26

## 2022-01-10 RX ADMIN — Medication 650 MILLIGRAM(S): at 16:55

## 2022-01-10 RX ADMIN — Medication 2 MILLIGRAM(S): at 08:30

## 2022-01-10 RX ADMIN — Medication 650 MILLIGRAM(S): at 08:30

## 2022-01-10 RX ADMIN — QUETIAPINE FUMARATE 500 MILLIGRAM(S): 200 TABLET, FILM COATED ORAL at 21:26

## 2022-01-10 RX ADMIN — LORATADINE 10 MILLIGRAM(S): 10 TABLET ORAL at 08:30

## 2022-01-10 NOTE — BH INPATIENT PSYCHIATRY PROGRESS NOTE - NSBHFUPINTERVALHXFT_PSY_A_CORE
Chart reviewed. Case discussed with interdisciplinary team. Patient seen and examined for follow up of hemant. No acute overnight events. Compliant with standing medication. Required PRN haloperidol and lorazepam PO yesterday for agitation. Per sleep log, fair sleep.     Patient seen this morning in day room by writer and treatment team. Pt is initially guarded and reserved, but soon becomes tearful, stating she feels like nobody listens to her, nobody understands her, everyone blames her for everything that goes wrong, and no matter how hard she tries it feels like she never wins. She talks about how she never asks for help because she both feels that she is perfectly capable, but also because it validates all the bad things that people think about her. She is hopeless about the direction of her life because she is "in and out of the hospital" and is both eager for discharge but also afraid that when she goes home she will "isolate" again. She endorses feeling frustrated, denies feeling depressed. Denies SI/HI. Denies AVH.     Good appetite and PO intake. Sleeping well. No side effects to medications reported.  Chart reviewed. Case discussed with interdisciplinary team. Patient seen and examined for follow up of hemant. No acute overnight events. Compliant with standing medication. Required PRN haloperidol and lorazepam PO yesterday for agitation. Per sleep log, fair sleep.     Patient seen this morning in day room by writer and treatment team. Pt is initially guarded and reserved, but soon becomes tearful, stating she feels like nobody listens to her, nobody understands her, everyone blames her for everything that goes wrong, and no matter how hard she tries it feels like she never wins. She talks about how she never asks for help because she both feels that she is perfectly capable, but also because it validates all the bad things that people think about her. She is hopeless about the direction of her life because she is "in and out of the hospital" and is both eager for discharge but also afraid that when she goes home she will "isolate" again. Support provided.  She endorses feeling frustrated, denies feeling depressed. Denies SI/HI. Denies AVH.     Good appetite and PO intake. Sleeping well. No side effects to medications reported.

## 2022-01-10 NOTE — BH INPATIENT PSYCHIATRY PROGRESS NOTE - NSBHASSESSSUMMFT_PSY_ALL_CORE
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   Bipolar Disorder 1, manic with psychosis  Currently grandiose, distractible, increased energy, psychomotor agitation, intrusive and disruptive on the unit, poor insight into illness. Seroquel 150mg had been well tolerated on prior admission, will plan to increase dose.  Continued inpatient admission required for safety, stabilization, medication optimization, safe discharge planning.    1/4: Manic symptoms persist, irritable and grandiose, oppositional, pressured, interrupting denying that she is in a manic episode or that she requires hospitalization. Refuses lithium, will dc.   1/5 remains floridly manic  1/6: Manic symptoms significantly improved. Patient no longer hyperverbal, less irritable. Willing to engage with interview and discuss medication options. Will continue quetiapine.   1/7 More manic again after some improvement yesterday  1/10: Tearful and vulnerable on exam today, frustrated and hopeless.     PLAN:  1. Legal: Admitted on 9.39 status  2. Safety: No reported SI/SIB/HI/VI currently on unit; continue routine observation.   - PRNs: lorazepam 2mg PO/IM qh6, haloperidol 5mg PO/IM q6h, diphenhydramine 50 PO/IM q6h all PRN for agitation  3. Psychiatric:   - Cotinue quetiapine 500 mg PO qhs  4. Therapy: group & milieu therapy  5. Medical: no current medical issues. Recent breast surgery ~7months prior to admission, seen by hospitalist, recommending routine follow up with no interventions urgently required.  6. Collateral: Collateral from father obtained c/w manic episode  7. Disposition: When stable (include possible disposition plans when known)

## 2022-01-11 DIAGNOSIS — Z98.890 OTHER SPECIFIED POSTPROCEDURAL STATES: Chronic | ICD-10-CM

## 2022-01-11 RX ORDER — QUETIAPINE FUMARATE 200 MG/1
1 TABLET, FILM COATED ORAL
Qty: 14 | Refills: 0
Start: 2022-01-11 | End: 2022-01-24

## 2022-01-11 RX ORDER — QUETIAPINE FUMARATE 200 MG/1
5 TABLET, FILM COATED ORAL
Qty: 70 | Refills: 0
Start: 2022-01-11 | End: 2022-01-24

## 2022-01-11 RX ADMIN — QUETIAPINE FUMARATE 500 MILLIGRAM(S): 200 TABLET, FILM COATED ORAL at 20:53

## 2022-01-11 RX ADMIN — Medication 1 TABLET(S): at 10:34

## 2022-01-11 RX ADMIN — Medication 1 SPRAY(S): at 10:36

## 2022-01-11 RX ADMIN — Medication 2 MILLIGRAM(S): at 10:36

## 2022-01-11 RX ADMIN — Medication 1 SPRAY(S): at 22:07

## 2022-01-11 RX ADMIN — Medication 1 DROP(S): at 10:36

## 2022-01-11 RX ADMIN — Medication 50 MILLIGRAM(S): at 17:39

## 2022-01-11 RX ADMIN — Medication 1 DROP(S): at 22:07

## 2022-01-11 RX ADMIN — LORATADINE 10 MILLIGRAM(S): 10 TABLET ORAL at 10:36

## 2022-01-11 NOTE — BH INPATIENT PSYCHIATRY DISCHARGE NOTE - MODIFICATIONS
Reviewed and agree with above discharge summary; corrections and modification made where appropriate.

## 2022-01-11 NOTE — BH INPATIENT PSYCHIATRY PROGRESS NOTE - NSBHASSESSSUMMFT_PSY_ALL_CORE
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   Bipolar Disorder 1, manic with psychosis  Currently grandiose, distractible, increased energy, psychomotor agitation, intrusive and disruptive on the unit, poor insight into illness. Seroquel 150mg had been well tolerated on prior admission, will plan to increase dose.  Continued inpatient admission required for safety, stabilization, medication optimization, safe discharge planning.    1/4: Manic symptoms persist, irritable and grandiose, oppositional, pressured, interrupting denying that she is in a manic episode or that she requires hospitalization. Refuses lithium, will dc.   1/5 remains floridly manic  1/6: Manic symptoms significantly improved. Patient no longer hyperverbal, less irritable. Willing to engage with interview and discuss medication options. Will continue quetiapine.   1/7 More manic again after some improvement yesterday  1/10: Tearful and vulnerable on exam today, frustrated and hopeless.   1/11: Sleeping well. No pressured speech, no grandiosity, no distractibility. Able to reflect on presentation and contextualize symptoms as part of illness. Eager for discharge and agreeable to PHP. Compliant with meds.    PLAN:  1. Legal: Admitted on 9.39 status  2. Safety: No reported SI/SIB/HI/VI currently on unit; continue routine observation.   - PRNs: lorazepam 2mg PO/IM qh6, haloperidol 5mg PO/IM q6h, diphenhydramine 50 PO/IM q6h all PRN for agitation  3. Psychiatric:   - Cotinue quetiapine 500 mg PO qhs  4. Therapy: group & milieu therapy  5. Medical: Covid (+) currently asymptomatic. Recent breast surgery ~7months prior to admission, seen by hospitalist, recommending routine follow up outpatient with surgeon, no interventions urgently required.  6. Collateral: Collateral from father obtained c/w manic episode  7. Disposition: When stable (include possible disposition plans when known)
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
## 2022-01-11 NOTE — BH DISCHARGE NOTE NURSING/SOCIAL WORK/PSYCH REHAB - NSBHDCADDR1FT_A_CORE
remote appt  Please be available for zoom call at this time.  You will be emailed day prior to appt.

## 2022-01-11 NOTE — BH INPATIENT PSYCHIATRY PROGRESS NOTE - NSCGIIMPROVESXSCORE_CAL_PSY_ALL_CORE
I reviewed the H&P, I examined the patient, and there are no changes in the patient's condition. Done pre op noted now    2

## 2022-01-11 NOTE — BH INPATIENT PSYCHIATRY DISCHARGE NOTE - REASON FOR ADMISSION
Daina was admitted to [facility] with symptoms of hemant, including decreased sleep, agitation, and physical and verbal aggression.

## 2022-01-11 NOTE — BH INPATIENT PSYCHIATRY DISCHARGE NOTE - NSDCMRMEDTOKEN_GEN_ALL_CORE_FT
Multiple Vitamins oral tablet: 1 tab(s) orally once a day  QUEtiapine 50 mg oral tablet: 3 tab(s) orally once a day (at bedtime)  SEROquel 100 mg oral tablet: 1 tab(s) orally once a day (at bedtime) MDD:250mg   Multiple Vitamins oral tablet: 1 tab(s) orally once a day  QUEtiapine 200 mg oral tablet: Take 1 tab(s) orally once a day (at bedtime)      (take 1 200mg tab and 1 300mg tab for a total dose of 500mg per night)  QUEtiapine 300 mg oral tablet: Take 1 tab(s) orally once a day (at bedtime)     (take 1 200mg tab and 1 300mg tab nightly for a total of 500mg)

## 2022-01-11 NOTE — BH DISCHARGE NOTE NURSING/SOCIAL WORK/PSYCH REHAB - DISCHARGE INSTRUCTIONS AFTERCARE APPOINTMENTS
In order to check the location, date, or time of your aftercare appointment, please refer to your Discharge Instructions Document given to you upon leaving the hospital.  If you have lost the instructions please call [phone]

## 2022-01-11 NOTE — BH INPATIENT PSYCHIATRY DISCHARGE NOTE - CASE SUMMARY
On day of discharge, the patient has improved significantly and no longer requires inpatient treatment and care. Patient denies depressed mood, passive and active S/I/I/P, H/I/I/P or thoughts of self-harm. Patient denies grandiosity, racing thoughts, decreased sleep. Still some mildly pressured speech but much improved and no barrier to discharge. Patient is not judged to be an acute danger to self or others at this time. She is clinically stable and appropriate for discharge home with follow up at partial hospitalization program.

## 2022-01-11 NOTE — BH DISCHARGE NOTE NURSING/SOCIAL WORK/PSYCH REHAB - NSDCPRGOAL_PSY_ALL_CORE
Pt demonstrates progress into her psych rehab goal of demonstrating medication compliance and identifying benefits of compliance. Throughout this hospitalization, pt has compliant with medications and is tolerating them well. Pt demonstrates improvement in her sxs. Pt is calmer; thought process is more linear. Pt also reports better sleep and appetite. However, pt’s insight remains limited as pt is unable to identify any benefits compliance, instead pt reports that she prefers to have something natural such as herb tea, Ginseng and essential oil. Writer encouraged the pt to continue complying with medications in order to better take care of her mental health and prevent relapse. Writer attempted to meet pt for safety planning. Pt asked writer to use the safety plan from her previous hospitalization. Overall, pt is better behavioral control on the unit. Pt is able to follow COVID protocols. Pt is able to verbalize his needs, feelings and reach out to staff for support. Pt’s ADLs are good. Pt denies SI/HI/AH/VH. Due to the COVID-19 pandemic, unit structure and activities are re-evaluated on a consistent basis in effort to maintain the safety of patients and staff. Pt is receptive to psych rehab staff engagement during individual rounds.

## 2022-01-11 NOTE — BH INPATIENT PSYCHIATRY DISCHARGE NOTE - NSDCCPCAREPLAN_GEN_ALL_CORE_FT
PRINCIPAL DISCHARGE DIAGNOSIS  Diagnosis: Bipolar disorder  Assessment and Plan of Treatment:        PRINCIPAL DISCHARGE DIAGNOSIS  Diagnosis: Bipolar disorder  Assessment and Plan of Treatment:       SECONDARY DISCHARGE DIAGNOSES  Diagnosis: COVID-19 virus infection  Assessment and Plan of Treatment:

## 2022-01-11 NOTE — BH INPATIENT PSYCHIATRY DISCHARGE NOTE - NSBHDCSIGEVENTSFT_PSY_A_CORE
Emergency Department 01/02/2022   - Received involuntary intramuscular medication for aggressio and disorganization not responsive to redirection: haloperidol 5 mg, lorazepam 2 mg, diphenhydramine 50 mg  - Placed in 4 point restraints for aggression and disorganization not responsive to redirection or mitigated by medication

## 2022-01-11 NOTE — BH INPATIENT PSYCHIATRY DISCHARGE NOTE - HOSPITAL COURSE
Dates of hospitalization:     On admission to , patient reports recent symptoms of decreased sleep, pressured speech, agitation, increased energy and goal-directed activity, verbal and physical aggression, grandiosity.    Patient's existing dose of quetiapine 250mg was continued and gradually uptitrated to 500mg qhs with good effect and tolerability.     Throughout his hospital course, the patient endorsed a sustained improvement in mood, and continued to deny symptoms of depression, SI/I/P, HI or thoughts of self-harm.      At time of discharge, patient reports  **_______.       There were no behavioral problems on the unit. Patient did not become agitated and did not require emergent intramuscular medications or seclusion/restraints. Patient did not self-harm on the unit. Patient remained actively engaged in treatment. Patient participated in individual, group, and milieu therapy. Patient got along appropriately with staff and peers.     Family was contacted at the time of admission to obtain collateral, provide psychoeducation, and collaboratively treatment plan. A family meeting was held prior to discharge for safety planning and disposition planning.     Patient did not have any medical problems during this hospitalization. There were no medical consults.    On day of discharge, the patient has improved significantly and no longer requires inpatient treatment and care. Patient denies depressed mood, passive and active S/I/I/P, H/I/I/P or thoughts of self-harm. Patient is not judged to be an acute danger to self or others at this time.       Risk Assessment:     The patient is at CHRONIC risk of harm to self. Risk factors include **_____________. Protective factors include **_____________________.     On admission the patient was felt to be at an acutely elevated risk of harm to self given their acute presenting sxs of **___________. Over the hospital course, these acute risk factors were addressed by starting medication targeting depression and engagement in group and individual therapy focused on developing skills. The patient was adherent to medication and attended individual and group therapy. On the day of discharge, patient appears euthymic, and denies depressed mood, passive and active S/I/I/P, H/I/I/P or thoughts of self-harm. Patient is future-oriented and hopeful. Given the above, the patient is no longer felt to be at an acutely elevated risk of self-harm and therefore no longer requires inpatient hospitalization. Patient is stable for transition to outpatient level of care.      Patient will be discharged with the following DSM5 diagnoses:   1.  Bipolar 1 Disorder    Patient will be discharged on the following medications:   1.  quetiapine 500mg PO qhs (14 day supply, dispensed as 200mg tablets + 300mg tablets) Dates of hospitalization:     On admission to , patient reports recent symptoms of decreased sleep, pressured speech, agitation, increased energy and goal-directed activity, verbal and physical aggression, grandiosity.    Patient's existing dose of quetiapine 250mg was continued and gradually uptitrated to 500mg qhs with good effect and tolerability.     Throughout his hospital course, the patient endorsed a sustained improvement in mood, and continued to deny symptoms of depression, SI/I/P, HI or thoughts of self-harm.     At time of discharge, patient reports  that she feels "clear minded" and denies feeling manic. Her sleep improved and by the time of discharge she was sleeping throughout the night. Her grandiosity was reduced. Agitation and increased energy significantly reduced. Flight of ideas and disorganized thought process significantly improved; patient was somewhat overinclusive on day of discharge but overall goal-directed. She is familiar with and agreeable to her outpatient treatment and medication plan.     There were no significant behavioral problems on the unit, although patient was intrusive and disruptive for the first several days of her hospitalization and required haloperidol PO PRN multiple times. In the ED on admission, the patient did become agitated and did require emergent intramuscular medications and restraints. Patient did not require involuntary intramuscular medications nor restraints nor seclusion on the unit. Patient did not self-harm on the unit. Patient remained actively engaged in treatment. Patient participated in individual and milieu therapy. Patient got along appropriately with staff and peers.     Family was contacted at the time of admission to obtain collateral, provide psychoeducation, and collaboratively treatment plan. Family was contacted prior to discharge for safety planning and disposition planning.     Patient is 7-8 weeks status post breast reduction surgery with inadequate follow up due to repeated hospitalizations. She complained of breast pain and discharge on the unit and was seen and evaluated by the hospitalist, found to have well-healing surgical scars without evidence of inflammation or dehiscence and was recommended to obtain routine outpatient follow up with her surgeon. Patient aware of need to follow up and in agreement with plan.     On day of discharge, the patient has improved significantly and no longer requires inpatient treatment and care. Patient denies depressed mood, passive and active S/I/I/P, H/I/I/P or thoughts of self-harm. Patient denies grandiosity, racing thoughts, decreased sleep. Still some mildly pressured speech but much improved and no barrier to discharge. Patient is not judged to be an acute danger to self or others at this time. She is clinically stable and appropriate for discharge home with follow up at partial hospitalization program.      Risk Assessment:     The patient is at CHRONIC risk of harm to self. Risk factors include mood disorder, psychotic disorder, history of impulsivity. Protective factors include responsibility to family, spiritual values, supportive family and social network, engaged in school, adherent to treatment.     On admission the patient was felt to be at an acutely elevated risk of harm to self given their acute presenting sxs of pscyhosis, impulsivity, insomnia, recent inpatient discharge. Over the hospital course, these acute risk factors were addressed by starting medication targeting hemant and engagement in individual therapy focused on developing skills. The patient was adherent to medication. On the day of discharge, patient appears euthymic, and denies depressed mood, passive and active S/I/I/P, H/I/I/P or thoughts of self-harm; denies grandiosity, racing thoughts, decreased sleep, no evidence of psychotic thought content. Patient is future-oriented and hopeful. Given the above, the patient is no longer felt to be at an acutely elevated risk of self-harm and therefore no longer requires inpatient hospitalization. Patient is stable for transition to outpatient level of care.      Patient will be discharged with the following DSM5 diagnoses:   1.  Bipolar 1 Disorder    Patient will be discharged on the following medications:   1.  quetiapine 500mg PO qhs (14 day supply, dispensed as 200mg tablets + 300mg tablets) Dates of hospitalization 01/02/2022 - 01/12/2022    On admission to , patient reports recent symptoms of decreased sleep, pressured speech, agitation, increased energy and goal-directed activity, verbal and physical aggression, grandiosity.    Patient's existing dose of quetiapine 250mg was continued and gradually uptitrated to 500mg qhs with good effect and tolerability.     Throughout his hospital course, the patient endorsed a sustained improvement in mood, and continued to deny symptoms of depression, SI/I/P, HI or thoughts of self-harm.     At time of discharge, patient reports  that she feels "clear minded" and denies feeling manic. Her sleep improved and by the time of discharge she was sleeping throughout the night. Her grandiosity was reduced. Agitation and increased energy significantly reduced. Flight of ideas and disorganized thought process significantly improved; patient was somewhat overinclusive on day of discharge but overall goal-directed. She is familiar with and agreeable to her outpatient treatment and medication plan.     There were no significant behavioral problems on the unit, although patient was intrusive and disruptive for the first several days of her hospitalization and required haloperidol PO PRN multiple times. In the ED on admission, the patient did become agitated and did require emergent intramuscular medications and restraints. Patient did not require involuntary intramuscular medications nor restraints nor seclusion on the unit. Patient did not self-harm on the unit. Patient remained actively engaged in treatment. Patient participated in individual and milieu therapy. Patient got along appropriately with staff and peers.     Family was contacted at the time of admission to obtain collateral, provide psychoeducation, and collaboratively treatment plan. Family was contacted prior to discharge for safety planning and disposition planning.     Patient is 7-8 weeks status post breast reduction surgery with inadequate follow up due to repeated hospitalizations. She complained of breast pain and discharge on the unit and was seen and evaluated by the hospitalist, found to have well-healing surgical scars without evidence of inflammation or dehiscence and was recommended to obtain routine outpatient follow up with her surgeon. Patient aware of need to follow up and in agreement with plan.     On day of discharge, the patient has improved significantly and no longer requires inpatient treatment and care. Patient denies depressed mood, passive and active S/I/I/P, H/I/I/P or thoughts of self-harm. Patient denies grandiosity, racing thoughts, decreased sleep. Still some mildly pressured speech but much improved and no barrier to discharge. Patient is not judged to be an acute danger to self or others at this time. She is clinically stable and appropriate for discharge home with follow up at partial hospitalization program.      Risk Assessment:     The patient is at CHRONIC risk of harm to self. Risk factors include mood disorder, psychotic disorder, history of impulsivity. Protective factors include responsibility to family, spiritual values, supportive family and social network, engaged in school, adherent to treatment.     On admission the patient was felt to be at an acutely elevated risk of harm to self given their acute presenting sxs of pscyhosis, impulsivity, insomnia, recent inpatient discharge. Over the hospital course, these acute risk factors were addressed by starting medication targeting hemant and engagement in individual therapy focused on developing skills. The patient was adherent to medication. On the day of discharge, patient appears euthymic, and denies depressed mood, passive and active S/I/I/P, H/I/I/P or thoughts of self-harm; denies grandiosity, racing thoughts, decreased sleep, no evidence of psychotic thought content. Patient is future-oriented and hopeful. Given the above, the patient is no longer felt to be at an acutely elevated risk of self-harm and therefore no longer requires inpatient hospitalization. Patient is stable for transition to outpatient level of care.      Patient will be discharged with the following DSM5 diagnoses:   1.  Bipolar 1 Disorder    Patient will be discharged on the following medications:   1.  quetiapine 500mg PO qhs (14 day supply, dispensed as 200mg tablets + 300mg tablets)

## 2022-01-11 NOTE — BH INPATIENT PSYCHIATRY DISCHARGE NOTE - NSDCPROCEDURESFT_PSY_ALL_CORE
EKG 01/02/2022  Ventricular Rate 77 BPM  Atrial Rate 77 BPM  P-R Interval 180 ms  QRS Duration 78 ms  Q-T Interval 374 ms  QTC Calculation(Bazett) 423 ms  P Axis 53 degrees  R Axis 61 degrees  T Axis 28 degrees  Diagnosis Line Normal sinus rhythm  *Normal ECG*

## 2022-01-11 NOTE — BH INPATIENT PSYCHIATRY DISCHARGE NOTE - NSICDXPASTSURGICALHX_GEN_ALL_CORE_FT
PAST SURGICAL HISTORY:  No significant past surgical history      PAST SURGICAL HISTORY:  Status post breast reduction

## 2022-01-11 NOTE — BH DISCHARGE NOTE NURSING/SOCIAL WORK/PSYCH REHAB - PATIENT PORTAL LINK FT
You can access the FollowMyHealth Patient Portal offered by [facility] by registering at the following website: [email or web address]. By joining [facility]’s FollowMyHealth portal, you will also be able to view your health information using other applications (apps) compatible with our system.

## 2022-01-11 NOTE — BH INPATIENT PSYCHIATRY DISCHARGE NOTE - NSBHDCMEDICALFT_PSY_A_CORE
Patient with history of breast reduction surgery 7-8 weeks prior without adequate surgical follow up due to repeated hospitalizations. Patient evaluated by hospitalist, found to have no acute medical needs, recommended outpatient follow up with surgeon. Patient aware and in agreement.

## 2022-01-11 NOTE — BH INPATIENT PSYCHIATRY DISCHARGE NOTE - FAMILY HISTORY OF PSYCHIATRIC ILLNESS / SUICIDALITY
Colon  3 year follow up    The pathology results demonstrated aserrated adenoma; recommend a 3 yr follow up exam    Last 8/2/2018  
Patient called to schedule Colonoscopy.     Procedure:  COLONSCOPY  Dx:  SERRATED ADENOMA   Ordering Provider:  DR IGLESIAS  RT? (order if BMI over 40 and/or if patient has sleep apnea):  N/A  Is the patient on blood thinners? (warfarin, coumadin, plavix, or xarelto; if yes, patient will need to check with prescribing doctor to determine if they can hold prior to procedure):  NO      Will patient have ?: YES  Prep instructions sent to patient via: MAILED USPS      Do you have a Spinal cord stimulator? NO   If yes, patient needs to turn it off before coming into procedure.        For Colonoscopies: (if not having colonoscopy; delete this section):    Do you suffer from constipation? (If yes, order 2 day colon prep):  NO- PER PT     Does patient have any kidney issues (check last creatinine level (if >1.2, then use double Miralax prep). NO    Has the patient ever had a colonoscopy? YES    If yes:     Last colonoscopy and result? SERRATED ADENOMA    Family history of colon polyps/colon cancer?       Reminded patient to arrive at 7:30 AM, to our surgery center, in [place], on 9/24/21, for COLONOSCOPY procedure at 8:30 AM.     Asked patient if there is any further questions? Patient verbally acknowledged with understanding.    Covid Vaccination completed 3/5/21 & 4/2/21   
Unable to assess

## 2022-01-11 NOTE — BH DISCHARGE NOTE NURSING/SOCIAL WORK/PSYCH REHAB - NSCDUDCCRISIS_PSY_A_CORE
[facility]  1 (467) [facility]LL ([phone])  Text "WELL" to 21205  Website: www.[employer]/.[employer] [facility] 1 ([phone] 381-ZAAP (5863) Website: www.safehorizon.org/.National Suicide Prevention Lifeline 2 (337) 063-7452/.  [employer]  1 (409) LIFEN[facility] (270-6040)/.  [facility]  [phone] [place] [address] [phone]   Hours:  Monday through Friday from 9 AM to 3 PM/.  [employer]  [employer] [facility] Crisis Line  1 [phone], Option 1

## 2022-01-12 VITALS
HEART RATE: 78 BPM | OXYGEN SATURATION: 100 % | DIASTOLIC BLOOD PRESSURE: 77 MMHG | TEMPERATURE: 98 F | SYSTOLIC BLOOD PRESSURE: 134 MMHG

## 2022-01-12 PROCEDURE — 99238 HOSP IP/OBS DSCHRG MGMT 30/<: CPT

## 2022-01-12 RX ADMIN — Medication 2 MILLIGRAM(S): at 09:00

## 2022-01-12 RX ADMIN — Medication 100 MILLIGRAM(S): at 08:59

## 2022-01-12 RX ADMIN — LIDOCAINE 1 PATCH: 4 CREAM TOPICAL at 08:59

## 2022-01-12 RX ADMIN — LORATADINE 10 MILLIGRAM(S): 10 TABLET ORAL at 09:00

## 2022-01-12 RX ADMIN — Medication 1 SPRAY(S): at 09:00

## 2022-01-12 RX ADMIN — Medication 1 TABLET(S): at 08:58

## 2022-01-12 RX ADMIN — Medication 1 DROP(S): at 09:00

## 2022-01-12 NOTE — BH INPATIENT PSYCHIATRY PROGRESS NOTE - NSICDXBHPRIMARYDX_PSY_ALL_CORE
Severe bipolar affective disorder with psychosis   F31.89  

## 2022-01-12 NOTE — BH INPATIENT PSYCHIATRY PROGRESS NOTE - NSBHINPTBILLING_PSY_ALL_CORE
12447 - Inpatient Moderate Complexity
20425 - Inpatient Moderate Complexity
60304 - Inpatient Moderate Complexity
10336 - Inpatient Moderate Complexity
76604 - Inpatient Moderate Complexity
24511 - Inpatient Low Complexity
94602 - Inpatient Low Complexity
79956 - Hospital Discharge Day Management; 30 min or less

## 2022-01-12 NOTE — BH INPATIENT PSYCHIATRY PROGRESS NOTE - NSBHCONSBHPROVCNTCTNOFT_PSY_A_CORE
Had been seeing a provider in [place], still working on identifying the provider (family not sure)

## 2022-01-12 NOTE — BH INPATIENT PSYCHIATRY PROGRESS NOTE - CASE SUMMARY
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   Bipolar Disorder 1, manic with psychosis  Currently grandiose, distractible, increased energy, psychomotor agitation, intrusive and disruptive on the unit, poor insight into illness. Seroquel 150mg had been well tolerated on prior admission, will plan to increase dose.  Continued inpatient admission required for safety, stabilization, medication optimization, safe discharge planning.      1/6/22 Patient less manic on exam today and has been compliant with quetiapine titration       PLAN:    Patient requires acute care  Admitted on 9.39 status  Patient does not require CO and will continue routine observation.   MEDS:  INCREASE  Quetiapine to 400mg PO qhs  Treatment will include individual therapy/supportive therapy/ rehab therapy/ psychopharmacological therapy and milieu therapy
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   Bipolar Disorder 1, manic with psychosis  Currently grandiose, distractible, increased energy, psychomotor agitation, intrusive and disruptive on the unit, poor insight into illness. Seroquel 150mg had been well tolerated on prior admission, will plan to increase dose.  Continued inpatient admission required for safety, stabilization, medication optimization, safe discharge planning.      1/4/22 Patient remains manic with irritable mood, racing thoughts, pressured speech,   refusing lithium and demanding her discharge through the courts but has been complaint with her quetiapine    PLAN:  Patient requires acute care  Admitted on 9.39 status  Patient does not require CO and will continue routine observation.   MEDS:  INCREASE  Quetiapine to 300mg PO qhs --> plan to titrate up as tolerated  Refusing Lithium   Treatment will include individual therapy/supportive therapy/ rehab therapy/ psychopharmacological therapy and milieu therapy
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   Bipolar Disorder 1, manic with psychosis  Currently grandiose, distractible, increased energy, psychomotor agitation, intrusive and disruptive on the unit, poor insight into illness. Seroquel 150mg had been well tolerated on prior admission, will plan to increase dose.  Continued inpatient admission required for safety, stabilization, medication optimization, safe discharge planning.      1/3/22 Patient floridly manic with euphoric/elated mood, racing thoughts, pressured speech, decreased sleep, increased energy all in the context of noncompliance with low dose quetiapine  Discussed increasing quetiapine to therapeutic doses and adding Lithium as a classic mood stabilizer      PLAN:    Patient requires acute care  Admitted on 9.39 status  Patient does not require CO and will continue routine observation.   MEDS:  INCREASE  Quetiapine to 300mg PO qhs --> plan to titrate up as tolerated  START Lithium ER 450mg PO qhs --> plan to titrate up  Treatment will include individual therapy/supportive therapy/ rehab therapy/ psychopharmacological therapy and milieu therapy
The patient continues to have some waxing and waning of hemant and mood lability, but is showing some improvement overall.  She has been overall more calm on the unit, sleeping better.  She remains compliant with medications, tolerating them well.  Will continue.
The patient continues to show improvement in symptoms.  She is sleeping well, mood is more stable, and she is no longer agitated.  She shows some insight into her symptoms prior to admission.  The patient has been compliant with medications, tolerating them well.  Will continue.
The patient has continued to show improvement in symptoms.  Mood is stable, no agitation, and thoughts are much more organized.  The patient understands the need for continued treatment and with PHP follow-up.  The patient denies any SI.  Behavior well controlled.  The patient is no longer an acute danger to herself or others, and is stable for discharge home.

## 2022-01-12 NOTE — BH INPATIENT PSYCHIATRY PROGRESS NOTE - NSTXMANICPROGRES_PSY_ALL_CORE
Improving
Met - goal discontinued
Improving
Met - goal discontinued
No Change
Improving
No Change
Improving

## 2022-01-12 NOTE — [facility] INPATIENT PSYCHIATRY PROGRESS NOTE - CURRENT MEDICATION
MEDICATIONS  (STANDING):  multivitamin 1 Tablet(s) Oral daily  QUEtiapine 500 milliGRAM(s) Oral at bedtime    MEDICATIONS  (PRN):  acetaminophen     Tablet .. 650 milliGRAM(s) Oral every 6 hours PRN Temp greater or equal to 38C (100.4F), Mild Pain (1 - 3)  benzocaine 15 mG/menthol 3.6 mG (Sugar-Free) Lozenge 1 Lozenge Oral four times a day PRN sore throat  diphenhydrAMINE 50 milliGRAM(s) Oral every 6 hours PRN Rash and/or Itching, agitation  diphenhydrAMINE Injectable 50 milliGRAM(s) IntraMuscular once PRN agitation  guaiFENesin Oral Liquid (Sugar-Free) 100 milliGRAM(s) Oral every 6 hours PRN cough  haloperidol     Tablet 5 milliGRAM(s) Oral every 6 hours PRN agitation  haloperidol    Injectable 5 milliGRAM(s) IntraMuscular Once PRN agitation  ibuprofen  Tablet. 600 milliGRAM(s) Oral every 6 hours PRN Mild Pain (1 - 3), Moderate Pain (4 - 6)  lidocaine   4% Patch 1 Patch Transdermal daily PRN back pain  loratadine 10 milliGRAM(s) Oral daily PRN allergies  LORazepam     Tablet 2 milliGRAM(s) Oral every 6 hours PRN Agitation  LORazepam   Injectable 2 milliGRAM(s) IntraMuscular Once PRN Agitation  nicotine  Polacrilex Gum 2 milliGRAM(s) Oral four times a day PRN nicotine withdrawal  sodium chloride 0.65% Nasal 1 Spray(s) Both Nostrils four times a day PRN Nasal Congestion  tetrahydrazoline Solution 1 Drop(s) Both EYES four times a day PRN dry eyes/ allergies  
MEDICATIONS  (STANDING):  multivitamin 1 Tablet(s) Oral daily  QUEtiapine 500 milliGRAM(s) Oral at bedtime    MEDICATIONS  (PRN):  acetaminophen     Tablet .. 650 milliGRAM(s) Oral every 6 hours PRN Temp greater or equal to 38C (100.4F), Mild Pain (1 - 3)  benzocaine 15 mG/menthol 3.6 mG (Sugar-Free) Lozenge 1 Lozenge Oral four times a day PRN sore throat  diphenhydrAMINE 50 milliGRAM(s) Oral every 6 hours PRN Rash and/or Itching, agitation  diphenhydrAMINE Injectable 50 milliGRAM(s) IntraMuscular once PRN agitation  haloperidol     Tablet 5 milliGRAM(s) Oral every 6 hours PRN agitation  haloperidol    Injectable 5 milliGRAM(s) IntraMuscular Once PRN agitation  ibuprofen  Tablet. 600 milliGRAM(s) Oral every 6 hours PRN Mild Pain (1 - 3), Moderate Pain (4 - 6)  lidocaine   4% Patch 1 Patch Transdermal daily PRN back pain  loratadine 10 milliGRAM(s) Oral daily PRN allergies  LORazepam     Tablet 2 milliGRAM(s) Oral every 6 hours PRN Agitation  LORazepam   Injectable 2 milliGRAM(s) IntraMuscular Once PRN Agitation  nicotine  Polacrilex Gum 2 milliGRAM(s) Oral four times a day PRN nicotine withdrawal  sodium chloride 0.65% Nasal 1 Spray(s) Both Nostrils four times a day PRN Nasal Congestion  tetrahydrazoline Solution 1 Drop(s) Both EYES four times a day PRN dry eyes/ allergies  
MEDICATIONS  (STANDING):  multivitamin 1 Tablet(s) Oral daily  QUEtiapine 500 milliGRAM(s) Oral at bedtime    MEDICATIONS  (PRN):  acetaminophen     Tablet .. 650 milliGRAM(s) Oral every 6 hours PRN Temp greater or equal to 38C (100.4F), Mild Pain (1 - 3)  benzocaine 15 mG/menthol 3.6 mG (Sugar-Free) Lozenge 1 Lozenge Oral four times a day PRN sore throat  diphenhydrAMINE 50 milliGRAM(s) Oral every 6 hours PRN Rash and/or Itching, agitation  diphenhydrAMINE Injectable 50 milliGRAM(s) IntraMuscular once PRN agitation  guaiFENesin Oral Liquid (Sugar-Free) 100 milliGRAM(s) Oral every 6 hours PRN cough  haloperidol     Tablet 5 milliGRAM(s) Oral every 6 hours PRN agitation  haloperidol    Injectable 5 milliGRAM(s) IntraMuscular Once PRN agitation  ibuprofen  Tablet. 600 milliGRAM(s) Oral every 6 hours PRN Mild Pain (1 - 3), Moderate Pain (4 - 6)  lidocaine   4% Patch 1 Patch Transdermal daily PRN back pain  loratadine 10 milliGRAM(s) Oral daily PRN allergies  LORazepam     Tablet 2 milliGRAM(s) Oral every 6 hours PRN Agitation  LORazepam   Injectable 2 milliGRAM(s) IntraMuscular Once PRN Agitation  nicotine  Polacrilex Gum 2 milliGRAM(s) Oral four times a day PRN nicotine withdrawal  sodium chloride 0.65% Nasal 1 Spray(s) Both Nostrils four times a day PRN Nasal Congestion  tetrahydrazoline Solution 1 Drop(s) Both EYES four times a day PRN dry eyes/ allergies  
MEDICATIONS  (STANDING):  lithium CR (ESKALITH-CR) 450 milliGRAM(s) Oral at bedtime  QUEtiapine 300 milliGRAM(s) Oral at bedtime    MEDICATIONS  (PRN):  acetaminophen     Tablet .. 650 milliGRAM(s) Oral every 6 hours PRN Temp greater or equal to 38C (100.4F), Mild Pain (1 - 3)  diphenhydrAMINE 50 milliGRAM(s) Oral every 6 hours PRN Rash and/or Itching, agitation  diphenhydrAMINE Injectable 50 milliGRAM(s) IntraMuscular once PRN agitation  haloperidol     Tablet 5 milliGRAM(s) Oral every 6 hours PRN agitation  haloperidol    Injectable 5 milliGRAM(s) IntraMuscular Once PRN agitation  LORazepam     Tablet 2 milliGRAM(s) Oral every 6 hours PRN Agitation  LORazepam   Injectable 2 milliGRAM(s) IntraMuscular Once PRN Agitation  nicotine  Polacrilex Gum 2 milliGRAM(s) Oral four times a day PRN current some day smoker  
MEDICATIONS  (STANDING):  multivitamin 1 Tablet(s) Oral daily  QUEtiapine 400 milliGRAM(s) Oral at bedtime    MEDICATIONS  (PRN):  acetaminophen     Tablet .. 650 milliGRAM(s) Oral every 6 hours PRN Temp greater or equal to 38C (100.4F), Mild Pain (1 - 3)  benzocaine 15 mG/menthol 3.6 mG (Sugar-Free) Lozenge 1 Lozenge Oral four times a day PRN sore throat  diphenhydrAMINE 50 milliGRAM(s) Oral every 6 hours PRN Rash and/or Itching, agitation  diphenhydrAMINE Injectable 50 milliGRAM(s) IntraMuscular once PRN agitation  haloperidol     Tablet 5 milliGRAM(s) Oral every 6 hours PRN agitation  haloperidol    Injectable 5 milliGRAM(s) IntraMuscular Once PRN agitation  ibuprofen  Tablet. 600 milliGRAM(s) Oral every 6 hours PRN Mild Pain (1 - 3), Moderate Pain (4 - 6)  lidocaine   4% Patch 1 Patch Transdermal daily PRN back pain  loratadine 10 milliGRAM(s) Oral daily PRN allergies  LORazepam     Tablet 2 milliGRAM(s) Oral every 6 hours PRN Agitation  LORazepam   Injectable 2 milliGRAM(s) IntraMuscular Once PRN Agitation  nicotine  Polacrilex Gum 2 milliGRAM(s) Oral four times a day PRN nicotine withdrawal  sodium chloride 0.65% Nasal 1 Spray(s) Both Nostrils four times a day PRN Nasal Congestion  tetrahydrazoline Solution 1 Drop(s) Both EYES four times a day PRN dry eyes/ allergies  
MEDICATIONS  (STANDING):  multivitamin 1 Tablet(s) Oral daily  QUEtiapine 500 milliGRAM(s) Oral at bedtime    MEDICATIONS  (PRN):  acetaminophen     Tablet .. 650 milliGRAM(s) Oral every 6 hours PRN Temp greater or equal to 38C (100.4F), Mild Pain (1 - 3)  benzocaine 15 mG/menthol 3.6 mG (Sugar-Free) Lozenge 1 Lozenge Oral four times a day PRN sore throat  diphenhydrAMINE 50 milliGRAM(s) Oral every 6 hours PRN Rash and/or Itching, agitation  diphenhydrAMINE Injectable 50 milliGRAM(s) IntraMuscular once PRN agitation  haloperidol     Tablet 5 milliGRAM(s) Oral every 6 hours PRN agitation  haloperidol    Injectable 5 milliGRAM(s) IntraMuscular Once PRN agitation  ibuprofen  Tablet. 600 milliGRAM(s) Oral every 6 hours PRN Mild Pain (1 - 3), Moderate Pain (4 - 6)  lidocaine   4% Patch 1 Patch Transdermal daily PRN back pain  loratadine 10 milliGRAM(s) Oral daily PRN allergies  LORazepam     Tablet 2 milliGRAM(s) Oral every 6 hours PRN Agitation  LORazepam   Injectable 2 milliGRAM(s) IntraMuscular Once PRN Agitation  nicotine  Polacrilex Gum 2 milliGRAM(s) Oral four times a day PRN nicotine withdrawal  sodium chloride 0.65% Nasal 1 Spray(s) Both Nostrils four times a day PRN Nasal Congestion  tetrahydrazoline Solution 1 Drop(s) Both EYES four times a day PRN dry eyes/ allergies  
MEDICATIONS  (STANDING):  lithium CR (ESKALITH-CR) 450 milliGRAM(s) Oral at bedtime  multivitamin 1 Tablet(s) Oral daily  QUEtiapine 300 milliGRAM(s) Oral at bedtime    MEDICATIONS  (PRN):  acetaminophen     Tablet .. 650 milliGRAM(s) Oral every 6 hours PRN Temp greater or equal to 38C (100.4F), Mild Pain (1 - 3)  benzocaine 15 mG/menthol 3.6 mG (Sugar-Free) Lozenge 1 Lozenge Oral four times a day PRN sore throat  diphenhydrAMINE 50 milliGRAM(s) Oral every 6 hours PRN Rash and/or Itching, agitation  diphenhydrAMINE Injectable 50 milliGRAM(s) IntraMuscular once PRN agitation  haloperidol     Tablet 5 milliGRAM(s) Oral every 6 hours PRN agitation  haloperidol    Injectable 5 milliGRAM(s) IntraMuscular Once PRN agitation  loratadine 10 milliGRAM(s) Oral daily PRN allergies  LORazepam     Tablet 2 milliGRAM(s) Oral every 6 hours PRN Agitation  LORazepam   Injectable 2 milliGRAM(s) IntraMuscular Once PRN Agitation  nicotine  Polacrilex Gum 2 milliGRAM(s) Oral four times a day PRN nicotine withdrawal  sodium chloride 0.65% Nasal 1 Spray(s) Both Nostrils four times a day PRN Nasal Congestion  tetrahydrazoline Solution 1 Drop(s) Both EYES four times a day PRN dry eyes/ allergies  
MEDICATIONS  (STANDING):  multivitamin 1 Tablet(s) Oral daily  QUEtiapine 500 milliGRAM(s) Oral at bedtime    MEDICATIONS  (PRN):  acetaminophen     Tablet .. 650 milliGRAM(s) Oral every 6 hours PRN Temp greater or equal to 38C (100.4F), Mild Pain (1 - 3)  benzocaine 15 mG/menthol 3.6 mG (Sugar-Free) Lozenge 1 Lozenge Oral four times a day PRN sore throat  diphenhydrAMINE 50 milliGRAM(s) Oral every 6 hours PRN Rash and/or Itching, agitation  diphenhydrAMINE Injectable 50 milliGRAM(s) IntraMuscular once PRN agitation  haloperidol     Tablet 5 milliGRAM(s) Oral every 6 hours PRN agitation  haloperidol    Injectable 5 milliGRAM(s) IntraMuscular Once PRN agitation  ibuprofen  Tablet. 600 milliGRAM(s) Oral every 6 hours PRN Mild Pain (1 - 3), Moderate Pain (4 - 6)  lidocaine   4% Patch 1 Patch Transdermal daily PRN back pain  loratadine 10 milliGRAM(s) Oral daily PRN allergies  LORazepam     Tablet 2 milliGRAM(s) Oral every 6 hours PRN Agitation  LORazepam   Injectable 2 milliGRAM(s) IntraMuscular Once PRN Agitation  nicotine  Polacrilex Gum 2 milliGRAM(s) Oral four times a day PRN nicotine withdrawal  sodium chloride 0.65% Nasal 1 Spray(s) Both Nostrils four times a day PRN Nasal Congestion  tetrahydrazoline Solution 1 Drop(s) Both EYES four times a day PRN dry eyes/ allergies  
MEDICATIONS  (STANDING):  multivitamin 1 Tablet(s) Oral daily  QUEtiapine 400 milliGRAM(s) Oral at bedtime    MEDICATIONS  (PRN):  acetaminophen     Tablet .. 650 milliGRAM(s) Oral every 6 hours PRN Temp greater or equal to 38C (100.4F), Mild Pain (1 - 3)  benzocaine 15 mG/menthol 3.6 mG (Sugar-Free) Lozenge 1 Lozenge Oral four times a day PRN sore throat  diphenhydrAMINE 50 milliGRAM(s) Oral every 6 hours PRN Rash and/or Itching, agitation  diphenhydrAMINE Injectable 50 milliGRAM(s) IntraMuscular once PRN agitation  haloperidol     Tablet 5 milliGRAM(s) Oral every 6 hours PRN agitation  haloperidol    Injectable 5 milliGRAM(s) IntraMuscular Once PRN agitation  lidocaine   4% Patch 1 Patch Transdermal daily PRN back pain  loratadine 10 milliGRAM(s) Oral daily PRN allergies  LORazepam     Tablet 2 milliGRAM(s) Oral every 6 hours PRN Agitation  LORazepam   Injectable 2 milliGRAM(s) IntraMuscular Once PRN Agitation  nicotine  Polacrilex Gum 2 milliGRAM(s) Oral four times a day PRN nicotine withdrawal  sodium chloride 0.65% Nasal 1 Spray(s) Both Nostrils four times a day PRN Nasal Congestion  tetrahydrazoline Solution 1 Drop(s) Both EYES four times a day PRN dry eyes/ allergies

## 2022-01-12 NOTE — [facility] INPATIENT PSYCHIATRY PROGRESS NOTE - PRN MEDS
MEDICATIONS  (PRN):  acetaminophen     Tablet .. 650 milliGRAM(s) Oral every 6 hours PRN Temp greater or equal to 38C (100.4F), Mild Pain (1 - 3)  benzocaine 15 mG/menthol 3.6 mG (Sugar-Free) Lozenge 1 Lozenge Oral four times a day PRN sore throat  diphenhydrAMINE 50 milliGRAM(s) Oral every 6 hours PRN Rash and/or Itching, agitation  diphenhydrAMINE Injectable 50 milliGRAM(s) IntraMuscular once PRN agitation  haloperidol     Tablet 5 milliGRAM(s) Oral every 6 hours PRN agitation  haloperidol    Injectable 5 milliGRAM(s) IntraMuscular Once PRN agitation  ibuprofen  Tablet. 600 milliGRAM(s) Oral every 6 hours PRN Mild Pain (1 - 3), Moderate Pain (4 - 6)  lidocaine   4% Patch 1 Patch Transdermal daily PRN back pain  loratadine 10 milliGRAM(s) Oral daily PRN allergies  LORazepam     Tablet 2 milliGRAM(s) Oral every 6 hours PRN Agitation  LORazepam   Injectable 2 milliGRAM(s) IntraMuscular Once PRN Agitation  nicotine  Polacrilex Gum 2 milliGRAM(s) Oral four times a day PRN nicotine withdrawal  sodium chloride 0.65% Nasal 1 Spray(s) Both Nostrils four times a day PRN Nasal Congestion  tetrahydrazoline Solution 1 Drop(s) Both EYES four times a day PRN dry eyes/ allergies  
MEDICATIONS  (PRN):  acetaminophen     Tablet .. 650 milliGRAM(s) Oral every 6 hours PRN Temp greater or equal to 38C (100.4F), Mild Pain (1 - 3)  benzocaine 15 mG/menthol 3.6 mG (Sugar-Free) Lozenge 1 Lozenge Oral four times a day PRN sore throat  diphenhydrAMINE 50 milliGRAM(s) Oral every 6 hours PRN Rash and/or Itching, agitation  diphenhydrAMINE Injectable 50 milliGRAM(s) IntraMuscular once PRN agitation  guaiFENesin Oral Liquid (Sugar-Free) 100 milliGRAM(s) Oral every 6 hours PRN cough  haloperidol     Tablet 5 milliGRAM(s) Oral every 6 hours PRN agitation  haloperidol    Injectable 5 milliGRAM(s) IntraMuscular Once PRN agitation  ibuprofen  Tablet. 600 milliGRAM(s) Oral every 6 hours PRN Mild Pain (1 - 3), Moderate Pain (4 - 6)  lidocaine   4% Patch 1 Patch Transdermal daily PRN back pain  loratadine 10 milliGRAM(s) Oral daily PRN allergies  LORazepam     Tablet 2 milliGRAM(s) Oral every 6 hours PRN Agitation  LORazepam   Injectable 2 milliGRAM(s) IntraMuscular Once PRN Agitation  nicotine  Polacrilex Gum 2 milliGRAM(s) Oral four times a day PRN nicotine withdrawal  sodium chloride 0.65% Nasal 1 Spray(s) Both Nostrils four times a day PRN Nasal Congestion  tetrahydrazoline Solution 1 Drop(s) Both EYES four times a day PRN dry eyes/ allergies  
MEDICATIONS  (PRN):  acetaminophen     Tablet .. 650 milliGRAM(s) Oral every 6 hours PRN Temp greater or equal to 38C (100.4F), Mild Pain (1 - 3)  benzocaine 15 mG/menthol 3.6 mG (Sugar-Free) Lozenge 1 Lozenge Oral four times a day PRN sore throat  diphenhydrAMINE 50 milliGRAM(s) Oral every 6 hours PRN Rash and/or Itching, agitation  diphenhydrAMINE Injectable 50 milliGRAM(s) IntraMuscular once PRN agitation  haloperidol     Tablet 5 milliGRAM(s) Oral every 6 hours PRN agitation  haloperidol    Injectable 5 milliGRAM(s) IntraMuscular Once PRN agitation  loratadine 10 milliGRAM(s) Oral daily PRN allergies  LORazepam     Tablet 2 milliGRAM(s) Oral every 6 hours PRN Agitation  LORazepam   Injectable 2 milliGRAM(s) IntraMuscular Once PRN Agitation  nicotine  Polacrilex Gum 2 milliGRAM(s) Oral four times a day PRN nicotine withdrawal  sodium chloride 0.65% Nasal 1 Spray(s) Both Nostrils four times a day PRN Nasal Congestion  tetrahydrazoline Solution 1 Drop(s) Both EYES four times a day PRN dry eyes/ allergies  
MEDICATIONS  (PRN):  acetaminophen     Tablet .. 650 milliGRAM(s) Oral every 6 hours PRN Temp greater or equal to 38C (100.4F), Mild Pain (1 - 3)  benzocaine 15 mG/menthol 3.6 mG (Sugar-Free) Lozenge 1 Lozenge Oral four times a day PRN sore throat  diphenhydrAMINE 50 milliGRAM(s) Oral every 6 hours PRN Rash and/or Itching, agitation  diphenhydrAMINE Injectable 50 milliGRAM(s) IntraMuscular once PRN agitation  haloperidol     Tablet 5 milliGRAM(s) Oral every 6 hours PRN agitation  haloperidol    Injectable 5 milliGRAM(s) IntraMuscular Once PRN agitation  ibuprofen  Tablet. 600 milliGRAM(s) Oral every 6 hours PRN Mild Pain (1 - 3), Moderate Pain (4 - 6)  lidocaine   4% Patch 1 Patch Transdermal daily PRN back pain  loratadine 10 milliGRAM(s) Oral daily PRN allergies  LORazepam     Tablet 2 milliGRAM(s) Oral every 6 hours PRN Agitation  LORazepam   Injectable 2 milliGRAM(s) IntraMuscular Once PRN Agitation  nicotine  Polacrilex Gum 2 milliGRAM(s) Oral four times a day PRN nicotine withdrawal  sodium chloride 0.65% Nasal 1 Spray(s) Both Nostrils four times a day PRN Nasal Congestion  tetrahydrazoline Solution 1 Drop(s) Both EYES four times a day PRN dry eyes/ allergies  
MEDICATIONS  (PRN):  acetaminophen     Tablet .. 650 milliGRAM(s) Oral every 6 hours PRN Temp greater or equal to 38C (100.4F), Mild Pain (1 - 3)  benzocaine 15 mG/menthol 3.6 mG (Sugar-Free) Lozenge 1 Lozenge Oral four times a day PRN sore throat  diphenhydrAMINE 50 milliGRAM(s) Oral every 6 hours PRN Rash and/or Itching, agitation  diphenhydrAMINE Injectable 50 milliGRAM(s) IntraMuscular once PRN agitation  guaiFENesin Oral Liquid (Sugar-Free) 100 milliGRAM(s) Oral every 6 hours PRN cough  haloperidol     Tablet 5 milliGRAM(s) Oral every 6 hours PRN agitation  haloperidol    Injectable 5 milliGRAM(s) IntraMuscular Once PRN agitation  ibuprofen  Tablet. 600 milliGRAM(s) Oral every 6 hours PRN Mild Pain (1 - 3), Moderate Pain (4 - 6)  lidocaine   4% Patch 1 Patch Transdermal daily PRN back pain  loratadine 10 milliGRAM(s) Oral daily PRN allergies  LORazepam     Tablet 2 milliGRAM(s) Oral every 6 hours PRN Agitation  LORazepam   Injectable 2 milliGRAM(s) IntraMuscular Once PRN Agitation  nicotine  Polacrilex Gum 2 milliGRAM(s) Oral four times a day PRN nicotine withdrawal  sodium chloride 0.65% Nasal 1 Spray(s) Both Nostrils four times a day PRN Nasal Congestion  tetrahydrazoline Solution 1 Drop(s) Both EYES four times a day PRN dry eyes/ allergies  
MEDICATIONS  (PRN):  acetaminophen     Tablet .. 650 milliGRAM(s) Oral every 6 hours PRN Temp greater or equal to 38C (100.4F), Mild Pain (1 - 3)  benzocaine 15 mG/menthol 3.6 mG (Sugar-Free) Lozenge 1 Lozenge Oral four times a day PRN sore throat  diphenhydrAMINE 50 milliGRAM(s) Oral every 6 hours PRN Rash and/or Itching, agitation  diphenhydrAMINE Injectable 50 milliGRAM(s) IntraMuscular once PRN agitation  haloperidol     Tablet 5 milliGRAM(s) Oral every 6 hours PRN agitation  haloperidol    Injectable 5 milliGRAM(s) IntraMuscular Once PRN agitation  lidocaine   4% Patch 1 Patch Transdermal daily PRN back pain  loratadine 10 milliGRAM(s) Oral daily PRN allergies  LORazepam     Tablet 2 milliGRAM(s) Oral every 6 hours PRN Agitation  LORazepam   Injectable 2 milliGRAM(s) IntraMuscular Once PRN Agitation  nicotine  Polacrilex Gum 2 milliGRAM(s) Oral four times a day PRN nicotine withdrawal  sodium chloride 0.65% Nasal 1 Spray(s) Both Nostrils four times a day PRN Nasal Congestion  tetrahydrazoline Solution 1 Drop(s) Both EYES four times a day PRN dry eyes/ allergies  
MEDICATIONS  (PRN):  acetaminophen     Tablet .. 650 milliGRAM(s) Oral every 6 hours PRN Temp greater or equal to 38C (100.4F), Mild Pain (1 - 3)  benzocaine 15 mG/menthol 3.6 mG (Sugar-Free) Lozenge 1 Lozenge Oral four times a day PRN sore throat  diphenhydrAMINE 50 milliGRAM(s) Oral every 6 hours PRN Rash and/or Itching, agitation  diphenhydrAMINE Injectable 50 milliGRAM(s) IntraMuscular once PRN agitation  haloperidol     Tablet 5 milliGRAM(s) Oral every 6 hours PRN agitation  haloperidol    Injectable 5 milliGRAM(s) IntraMuscular Once PRN agitation  ibuprofen  Tablet. 600 milliGRAM(s) Oral every 6 hours PRN Mild Pain (1 - 3), Moderate Pain (4 - 6)  lidocaine   4% Patch 1 Patch Transdermal daily PRN back pain  loratadine 10 milliGRAM(s) Oral daily PRN allergies  LORazepam     Tablet 2 milliGRAM(s) Oral every 6 hours PRN Agitation  LORazepam   Injectable 2 milliGRAM(s) IntraMuscular Once PRN Agitation  nicotine  Polacrilex Gum 2 milliGRAM(s) Oral four times a day PRN nicotine withdrawal  sodium chloride 0.65% Nasal 1 Spray(s) Both Nostrils four times a day PRN Nasal Congestion  tetrahydrazoline Solution 1 Drop(s) Both EYES four times a day PRN dry eyes/ allergies  
MEDICATIONS  (PRN):  acetaminophen     Tablet .. 650 milliGRAM(s) Oral every 6 hours PRN Temp greater or equal to 38C (100.4F), Mild Pain (1 - 3)  diphenhydrAMINE 50 milliGRAM(s) Oral every 6 hours PRN Rash and/or Itching, agitation  diphenhydrAMINE Injectable 50 milliGRAM(s) IntraMuscular once PRN agitation  haloperidol     Tablet 5 milliGRAM(s) Oral every 6 hours PRN agitation  haloperidol    Injectable 5 milliGRAM(s) IntraMuscular Once PRN agitation  LORazepam     Tablet 2 milliGRAM(s) Oral every 6 hours PRN Agitation  LORazepam   Injectable 2 milliGRAM(s) IntraMuscular Once PRN Agitation  nicotine  Polacrilex Gum 2 milliGRAM(s) Oral four times a day PRN current some day smoker  
MEDICATIONS  (PRN):  acetaminophen     Tablet .. 650 milliGRAM(s) Oral every 6 hours PRN Temp greater or equal to 38C (100.4F), Mild Pain (1 - 3)  benzocaine 15 mG/menthol 3.6 mG (Sugar-Free) Lozenge 1 Lozenge Oral four times a day PRN sore throat  diphenhydrAMINE 50 milliGRAM(s) Oral every 6 hours PRN Rash and/or Itching, agitation  diphenhydrAMINE Injectable 50 milliGRAM(s) IntraMuscular once PRN agitation  haloperidol     Tablet 5 milliGRAM(s) Oral every 6 hours PRN agitation  haloperidol    Injectable 5 milliGRAM(s) IntraMuscular Once PRN agitation  ibuprofen  Tablet. 600 milliGRAM(s) Oral every 6 hours PRN Mild Pain (1 - 3), Moderate Pain (4 - 6)  lidocaine   4% Patch 1 Patch Transdermal daily PRN back pain  loratadine 10 milliGRAM(s) Oral daily PRN allergies  LORazepam     Tablet 2 milliGRAM(s) Oral every 6 hours PRN Agitation  LORazepam   Injectable 2 milliGRAM(s) IntraMuscular Once PRN Agitation  nicotine  Polacrilex Gum 2 milliGRAM(s) Oral four times a day PRN nicotine withdrawal  sodium chloride 0.65% Nasal 1 Spray(s) Both Nostrils four times a day PRN Nasal Congestion  tetrahydrazoline Solution 1 Drop(s) Both EYES four times a day PRN dry eyes/ allergies

## 2022-01-12 NOTE — BH INPATIENT PSYCHIATRY PROGRESS NOTE - MSE UNSTRUCTURED FT
The patient appears stated age, fair hygiene and dressed in hospital gown.  The patient was irritable and oppositional, superficially cooperative with the interview, easily distracted.  Psychomotor agitated.  Steady gait observed.  The patient's speech was rapid, pressured, loud in volume.  The patient's mood is "good."  Affect is irritable, labile, congruent with mood.  The patient's thoughts are circumstantial, loose associations.  Denies any delusions or hallucinations. Denies any suicidal or homicidal ideation, intent, or plan.  Insight is impaired.  Judgment is impaired.  Impulse control impaired.
On exam today the patient is initially cooperative but quickly becomes irritable and uncooperative with questions.    Speech is rapid and pressured.  Thought process: with disorder of thought process.    Thought content: with paranoid psychotic beliefs about team and her family.   Affect: labile.    Patient ends meting and refuses to answer questions about SI/HI, AH and Mood, or Memory  Patient is Alert and oriented   Insight and judgment are impaired. Impulse control is tenuous at this time      
The patient appears stated age, fair hygiene and dressed in hospital gown.  The patient was energetic, superficially cooperative with the interview, easily distracted, poor eye contact.  Psychomotor agitated, pacing and stretching, getting up and down from chair.  Steady gait observed.  The patient's speech was rapid, pressured, loud in volume.  The patient's mood is "amazing."  Affect is elevated, labile, congruent with mood.  The patient's thoughts are circumstantial, illogical, loose associations.  Denies any delusions or hallucinations. Denies any suicidal or homicidal ideation, intent, or plan.  Insight is impaired.  Judgment is impaired.  Impulse control impaired.
The patient appears stated age, fair hygiene and dressed appropriately.  The patient was calm, cooperative with the interview and maintained appropriate eye contact.  No psychomotor agitation or retardation noted.  Steady gait observed.  The patient's speech was fluent, normal in tone, rate, and volume, mildly pressured.  The patient's mood is "excited." Affect is euthymic, full range, stable, and congruent with mood.  The patient's thought process is goal directed, thought content is without evidence of delusions.  Denies hallucinations. Denies any suicidal or homicidal ideation, intent, or plan.  Insight is fair.  Judgment is fair.  Impulse control has been fair on the unit.
The patient appears stated age, fair hygiene and dressed appropriately.  The patient was calm, cooperative with the interview and maintained appropriate eye contact.  No psychomotor agitation or retardation noted.  Steady gait observed.  The patient's speech was fluent, normal in tone, rate, and volume.  The patient's mood is "fine."  Affect is euthymic, full range, stable, and congruent with mood.  The patient's thought process is goal directed, thought content is without evidence of delusions.  Denies hallucinations. Denies any suicidal or homicidal ideation, intent, or plan.  Insight is fair.  Judgment is fair.  Impulse control has been fair on the unit.
The patient appears stated age, fair hygiene and dressed appropriately.  The patient was calm, cooperative with the interview and maintained appropriate eye contact.  No psychomotor agitation or retardation noted.  Steady gait observed.  The patient's speech was fluent, normal in tone, rate, and volume.  The patient's mood is "fine."  Affect is euthymic, full range, stable, and congruent with mood.  The patient's thought process is goal directed, thought content is without evidence of delusions.  Denies hallucinations. Denies any suicidal or homicidal ideation, intent, or plan.  Insight is fair.  Judgment is fair.  Impulse control has been fair on the unit.
On exam today the patient is floridly manic, irritable and uncooperative with questions.    Speech is rapid and pressured.  Thought process: with flight of ideas.    Thought content: with paranoid delusion that Gerson had invented COVID to hide the opiate epidemic.   Affect: labile.    Patient leaves the interview and refuses to answer questions about SI/HI, AH and Mood, or Memory  Patient is Alert and oriented   Insight and judgment are impaired. Impulse control is tenuous at this time  
The patient appears stated age, fair hygiene and dressed appropriately.  The patient was calm, cooperative with the interview and maintained appropriate eye contact.  No psychomotor agitation or retardation noted.  Steady gait observed.  The patient's speech was fluent, normal in tone, rate, and volume.  The patient's mood is "frustrated."  Affect is dysthymic, tearful at times, constricted, stable and congruent with mood.  The patient's thought process is goal directed, thought content is without evidence of delusions.  Denies hallucinations. Denies any suicidal or homicidal ideation, intent, or plan.  Insight is impaired.  Judgment is impaired.  Impulse control has been fair on the unit.
The patient appears stated age, fair hygiene and dressed appropriately.  The patient was guarded, cooperative with the interview and with fair eye contact.  No psychomotor agitation or retardation noted.  Steady gait observed.  The patient's speech was mildly pressured, normal in tone, rate, and volume.  The patient's mood is "fine."  Affect is labile.  The patient's thoughts are goal directed.  Denies any delusions or hallucinations. Denies any suicidal or homicidal ideation, intent, or plan.  Insight and judgment are impaired. Impulse control is tenuous at this time

## 2022-01-12 NOTE — BH INPATIENT PSYCHIATRY PROGRESS NOTE - NSBHASSESSSUMMFT_PSY_ALL_CORE
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   Bipolar Disorder 1, manic with psychosis  Currently grandiose, distractible, increased energy, psychomotor agitation, intrusive and disruptive on the unit, poor insight into illness. Seroquel 150mg had been well tolerated on prior admission, will plan to increase dose.  Continued inpatient admission required for safety, stabilization, medication optimization, safe discharge planning.    1/4: Manic symptoms persist, irritable and grandiose, oppositional, pressured, interrupting denying that she is in a manic episode or that she requires hospitalization. Refuses lithium, will dc.   1/5 remains floridly manic  1/6: Manic symptoms significantly improved. Patient no longer hyperverbal, less irritable. Willing to engage with interview and discuss medication options. Will continue quetiapine.   1/7 More manic again after some improvement yesterday  1/10: Tearful and vulnerable on exam today, frustrated and hopeless.   1/11: Sleeping well. No pressured speech, no grandiosity, no distractibility. Able to reflect on presentation and contextualize symptoms as part of illness. Eager for discharge and agreeable to PHP. Compliant with meds.  1/12: S    PLAN:  1. Legal: Admitted on 9.39 status  2. Safety: No reported SI/SIB/HI/VI currently on unit; continue routine observation.   - PRNs: lorazepam 2mg PO/IM qh6, haloperidol 5mg PO/IM q6h, diphenhydramine 50 PO/IM q6h all PRN for agitation  3. Psychiatric:   - Cotinue quetiapine 500 mg PO qhs  4. Therapy: group & milieu therapy  5. Medical: Covid (+) currently asymptomatic. Recent breast surgery ~7months prior to admission, seen by hospitalist, recommending routine follow up outpatient with surgeon, no interventions urgently required.  6. Collateral: Collateral from father obtained c/w manic episode  7. 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   Bipolar Disorder 1, manic with psychosis  Currently grandiose, distractible, increased energy, psychomotor agitation, intrusive and disruptive on the unit, poor insight into illness. Seroquel 150mg had been well tolerated on prior admission, will plan to increase dose.  Continued inpatient admission required for safety, stabilization, medication optimization, safe discharge planning.    1/4: Manic symptoms persist, irritable and grandiose, oppositional, pressured, interrupting denying that she is in a manic episode or that she requires hospitalization. Refuses lithium, will dc.   1/5 remains floridly manic  1/6: Manic symptoms significantly improved. Patient no longer hyperverbal, less irritable. Willing to engage with interview and discuss medication options. Will continue quetiapine.   1/7 More manic again after some improvement yesterday  1/10: Tearful and vulnerable on exam today, frustrated and hopeless.   1/11: Sleeping well. No pressured speech, no grandiosity, no distractibility. Able to reflect on presentation and contextualize symptoms as part of illness. Eager for discharge and agreeable to PHP. Compliant with meds.  1/12: Symptoms much improved, sleeping well, no grandiosity, no agitation.     PLAN:  1. Legal: Admitted on 9.39 status  2. Safety: No reported SI/SIB/HI/VI currently on unit; continue routine observation.   - PRNs: lorazepam 2mg PO/IM qh6, haloperidol 5mg PO/IM q6h, diphenhydramine 50 PO/IM q6h all PRN for agitation  3. Psychiatric:   - Cotinue quetiapine 500 mg PO qhs  4. Therapy: group & milieu therapy  5. Medical: Covid (+) currently asymptomatic. Recent breast surgery ~7months prior to admission, seen by hospitalist, recommending routine follow up outpatient with surgeon, no interventions urgently required.  6. Collateral: Collateral from father obtained c/w manic episode  7. 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   Bipolar Disorder 1, manic with psychosis  Currently grandiose, distractible, increased energy, psychomotor agitation, intrusive and disruptive on the unit, poor insight into illness. Seroquel 150mg had been well tolerated on prior admission, will plan to increase dose.  Continued inpatient admission required for safety, stabilization, medication optimization, safe discharge planning.    1/4: Manic symptoms persist, irritable and grandiose, oppositional, pressured, interrupting denying that she is in a manic episode or that she requires hospitalization. Refuses lithium, will dc.   1/5 remains floridly manic  1/6: Manic symptoms significantly improved. Patient no longer hyperverbal, less irritable. Willing to engage with interview and discuss medication options. Will continue quetiapine.   1/7 More manic again after some improvement yesterday  1/10: Tearful and vulnerable on exam today, frustrated and hopeless.   1/11: Sleeping well. No pressured speech, no grandiosity, no distractibility. Able to reflect on presentation and contextualize symptoms as part of illness. Eager for discharge and agreeable to PHP. Compliant with meds.  1/12: Symptoms much improved, sleeping well, no grandiosity, no agitation. Speech somewhat pressured today, more so than yesterday, however the excited of discharge may be contributing factor, and no contraindication to discharge given significant improvement in other symptoms and close follow up. Stable for discharge with follow up at [facility].    PLAN:  1. Legal: Admitted on 9.39 status  2. Safety: No reported SI/SIB/HI/VI currently on unit; continue routine observation.   - PRNs: lorazepam 2mg PO/IM qh6, haloperidol 5mg PO/IM q6h, diphenhydramine 50 PO/IM q6h all PRN for agitation  3. Psychiatric:   - Cotinue quetiapine 500 mg PO qhs  4. Therapy: group & milieu therapy  5. Medical: Covid (+) currently asymptomatic. Recent breast surgery ~7months prior to admission, seen by hospitalist, recommending routine follow up outpatient with surgeon, no interventions urgently required.  6. Collateral: Collateral from father obtained c/w manic episode  7. Disposition: stable for discharge to [facility]

## 2022-01-12 NOTE — BH INPATIENT PSYCHIATRY PROGRESS NOTE - NSTXMANICGOAL_PSY_ALL_CORE
Exhibit a substantial reduction in elated/angry acting out, and pressured speech that prevents mutual conversation
Be able to identify the early signs of hemant (e.g. sleep and mood changes) and to employ coping strategies to minimize acting out
Exhibit a substantial reduction in elated/angry acting out, and pressured speech that prevents mutual conversation
Be able to identify the early signs of hemant (e.g. sleep and mood changes) and to employ coping strategies to minimize acting out
Exhibit a substantial reduction in elated/angry acting out, and pressured speech that prevents mutual conversation

## 2022-01-12 NOTE — BH INPATIENT PSYCHIATRY PROGRESS NOTE - NSBHCHARTREVIEWLAB_PSY_A_CORE FT
CBC Full  -  ( 02 Jan 2022 00:47 )  WBC Count : 5.27 K/uL  RBC Count : 3.99 M/uL  Hemoglobin : 11.3 g/dL  Hematocrit : 36.7 %  Platelet Count - Automated : 324 K/uL  Mean Cell Volume : 92.0 fL  Mean Cell Hemoglobin : 28.3 pg  Mean Cell Hemoglobin Concentration : 30.8 gm/dL  Auto Neutrophil # : 2.02 K/uL  Auto Lymphocyte # : 1.83 K/uL  Auto Monocyte # : 0.71 K/uL  Auto Eosinophil # : 0.05 K/uL  Auto Basophil # : 0.00 K/uL  Auto Neutrophil % : 38.4 %  Auto Lymphocyte % : 34.8 %  Auto Monocyte % : 13.4 %  Auto Eosinophil % : 0.9 %  Auto Basophil % : 0.0 %  01-02    137  |  103  |  13  ----------------------------<  82  3.5   |  18<L>  |  0.78    Ca    8.9      02 Jan 2022 00:47    TPro  8.1  /  Alb  3.7  /  TBili  0.3  /  DBili  x   /  AST  25  /  ALT  15  /  AlkPhos  82  01-0    THC, Urine Qualitative: Positive (01.02.22 @ 00:47)     Thyroid Stimulating Hormone, Serum: 9.14 uIU/mL (01.02.22 @ 00:47)   

## 2022-01-12 NOTE — BH INPATIENT PSYCHIATRY PROGRESS NOTE - MODIFICATIONS
Patient interviewed and evaluated with resident present to follow up on manic symptoms and the case has been reviewed with the treatment team this morning.   I was physically present during the service to the patient and personally examined the patient and was directly involved in the management and recommendations of the care provided to the patient.  I have reviewed the above note and the mental status examination and I agree with its contents and made modifications as indicated
Patient seen by me, chart reviewed, and case discussed with treatment team.  Reviewed and agree with above progress note, assessment and plan; corrections and modification made where appropriate.

## 2022-01-12 NOTE — BH INPATIENT PSYCHIATRY PROGRESS NOTE - NSBHCHARTREVIEWINVESTIGATE_PSY_A_CORE FT
12/29/2022  Ventricular Rate 68 BPM  Atrial Rate 68 BPM  P-R Interval 186 ms  QRS Duration 86 ms  Q-T Interval 386 ms  QTC Calculation(Bazett) 410 ms  P Axis 43 degrees  R Axis 79 degrees  T Axis 40 degrees  Diagnosis Line Normal sinus rhythm  Normal ECG  

## 2022-01-12 NOTE — BH INPATIENT PSYCHIATRY PROGRESS NOTE - NSBHMETABOLIC_PSY_ALL_CORE_FT
BMI: BMI (kg/m2): 29.6 (01-01-22 @ 22:50)  HbA1c: A1C with Estimated Average Glucose Result: 5.3 % (01-04-22 @ 10:27)    Glucose:   BP: 134/77 (01-12-22 @ 06:45) (105/72 - 134/77)  Lipid Panel: Date/Time: 01-04-22 @ 10:27  Cholesterol, Serum: 142  Direct LDL: --  HDL Cholesterol, Serum: 51  Total Cholesterol/HDL Ration Measurement: --  Triglycerides, Serum: 61  
BMI: BMI (kg/m2): 29.6 (01-01-22 @ 22:50)  HbA1c: A1C with Estimated Average Glucose Result: 5.3 % (01-04-22 @ 10:27)    Glucose: 105    BP: 137/86 (01-03-22 @ 08:16) (121/82 - 137/86)    Lipid Profile (01.04.22 @ 10:27)   Cholesterol, Serum: 142 mg/dL   Triglycerides, Serum: 61 mg/dL   HDL Cholesterol, Serum: 51 mg/dL   Non HDL Cholesterol: 91  
BMI: BMI (kg/m2): 29.6 (01-01-22 @ 22:50)  HbA1c: A1C with Estimated Average Glucose Result: 5.3 % (01-04-22 @ 10:27)    Glucose:   BP: 108/92 (01-07-22 @ 08:11) (108/92 - 123/66)  Lipid Panel: Date/Time: 01-04-22 @ 10:27  Cholesterol, Serum: 142  Direct LDL: --  HDL Cholesterol, Serum: 51  Total Cholesterol/HDL Ration Measurement: --  Triglycerides, Serum: 61  
BMI: BMI (kg/m2): 29.6 (01-01-22 @ 22:50)  HbA1c: A1C with Estimated Average Glucose Result: 5.3 % (01-04-22 @ 10:27)    Glucose:   BP: 115/70 (01-10-22 @ 06:28) (114/70 - 123/83)  Lipid Panel: Date/Time: 01-04-22 @ 10:27  Cholesterol, Serum: 142  Direct LDL: --  HDL Cholesterol, Serum: 51  Total Cholesterol/HDL Ration Measurement: --  Triglycerides, Serum: 61  
BMI: BMI (kg/m2): 29.6 (01-01-22 @ 22:50)  HbA1c:   Glucose:   BP: 137/86 (01-03-22 @ 08:16) (121/82 - 137/86)  Lipid Panel: 
BMI: BMI (kg/m2): 29.6 (01-01-22 @ 22:50)  HbA1c: A1C with Estimated Average Glucose Result: 5.3 % (01-04-22 @ 10:27)    Glucose:   BP: 123/66 (01-06-22 @ 08:27) (123/66 - 123/66)  Lipid Panel: Date/Time: 01-04-22 @ 10:27  Cholesterol, Serum: 142  Direct LDL: --  HDL Cholesterol, Serum: 51  Total Cholesterol/HDL Ration Measurement: --  Triglycerides, Serum: 61  
BMI: BMI (kg/m2): 29.6 (01-01-22 @ 22:50)  HbA1c: A1C with Estimated Average Glucose Result: 5.3 % (01-04-22 @ 10:27)    Glucose:   BP: 137/86 (01-03-22 @ 08:16) (137/86 - 137/86)  Lipid Panel: Date/Time: 01-04-22 @ 10:27  Cholesterol, Serum: 142  Direct LDL: --  HDL Cholesterol, Serum: 51  Total Cholesterol/HDL Ration Measurement: --  Triglycerides, Serum: 61  
BMI: BMI (kg/m2): 29.6 (01-01-22 @ 22:50)  HbA1c: A1C with Estimated Average Glucose Result: 5.3 % (01-04-22 @ 10:27)    Glucose:   BP: 105/72 (01-11-22 @ 07:25) (105/72 - 115/70)  Lipid Panel: Date/Time: 01-04-22 @ 10:27  Cholesterol, Serum: 142  Direct LDL: --  HDL Cholesterol, Serum: 51  Total Cholesterol/HDL Ration Measurement: --  Triglycerides, Serum: 61  
BMI: BMI (kg/m2): 29.6 (01-01-22 @ 22:50)  HbA1c: A1C with Estimated Average Glucose Result: 5.3 % (01-04-22 @ 10:27)    Glucose:   BP: 134/77 (01-12-22 @ 06:45) (105/72 - 134/77)  Lipid Panel: Date/Time: 01-04-22 @ 10:27  Cholesterol, Serum: 142  Direct LDL: --  HDL Cholesterol, Serum: 51  Total Cholesterol/HDL Ration Measurement: --  Triglycerides, Serum: 61

## 2022-01-12 NOTE — BH INPATIENT PSYCHIATRY PROGRESS NOTE - NSTXMEDICDATETRGT_PSY_ALL_CORE
07-Jan-2022
07-Jan-2022
13-Jan-2022
10-Giorgio-2022
12-Jan-2022
10-Giorgio-2022
12-Jan-2022

## 2022-01-12 NOTE — BH INPATIENT PSYCHIATRY PROGRESS NOTE - NSTXMANICINTERMD_PSY_ALL_CORE
Med trial: quetiapine  Build therapeutic alliance, psychotherapy, psychoeducation

## 2022-01-12 NOTE — BH INPATIENT PSYCHIATRY PROGRESS NOTE - NSDCCRITERIA_PSY_ALL_CORE
no longer danger to self or others, symptomatic improvement, CGI >/= 2

## 2022-01-12 NOTE — BH INPATIENT PSYCHIATRY PROGRESS NOTE - NSTXMEDICGOAL_PSY_ALL_CORE
Be able to describe the benefit of medication/treatment
Take all medications as prescribed
Take all medications as prescribed
Be able to describe the benefit of medication/treatment
Take all medications as prescribed
Be able to describe the benefit of medication/treatment
Take all medications as prescribed
Take all medications as prescribed
Be able to describe the benefit of medication/treatment

## 2022-01-12 NOTE — BH INPATIENT PSYCHIATRY PROGRESS NOTE - NSBHCHARTREVIEWVS_PSY_A_CORE FT
Vital Signs Last 24 Hrs  T(C): 36.9 (01-11-22 @ 07:25), Max: 36.9 (01-11-22 @ 07:25)  T(F): 98.5 (01-11-22 @ 07:25), Max: 98.5 (01-11-22 @ 07:25)  HR: 118 (01-11-22 @ 07:25) (118 - 118)  BP: 105/72 (01-11-22 @ 07:25) (105/72 - 105/72)  BP(mean): --  RR: --  SpO2: 100% (01-10-22 @ 17:44) (100% - 100%)    
Vital Signs Last 24 Hrs  T(C): 35.9 (01-03-22 @ 08:16), Max: 37.1 (01-02-22 @ 20:01)  T(F): 96.7 (01-03-22 @ 08:16), Max: 98.7 (01-02-22 @ 20:01)  HR: 108 (01-03-22 @ 08:16) (108 - 108)  BP: 137/86 (01-03-22 @ 08:16) (137/86 - 137/86)  BP(mean): --  RR: --  SpO2: 96% (01-03-22 @ 08:16) (96% - 96%)    Orthostatic VS  01-02-22 @ 20:01  Lying BP: --/-- HR: --  Sitting BP: 111/61 HR: 85  Standing BP: 120/70 HR: 90  Site: upper left arm  Mode: electronic  
Vital Signs Last 24 Hrs  T(C): 37 (01-05-22 @ 08:15), Max: 37 (01-05-22 @ 08:15)  T(F): 98.6 (01-05-22 @ 08:15), Max: 98.6 (01-05-22 @ 08:15)  HR: --  BP: --  BP(mean): --  RR: --  SpO2: 98% (01-04-22 @ 17:40) (98% - 98%)    Orthostatic VS  01-05-22 @ 08:15  Lying BP: --/-- HR: --  Sitting BP: 127/73 HR: 82  Standing BP: 108/80 HR: 94  Site: --  Mode: --  Orthostatic VS  01-04-22 @ 19:11  Lying BP: --/-- HR: --  Sitting BP: 124/74 HR: 482  Standing BP: 130/73 HR: 92  Site: --  Mode: --  Orthostatic VS  01-04-22 @ 08:43  Lying BP: --/-- HR: --  Sitting BP: 148/96 HR: 106  Standing BP: 114/85 HR: 120  Site: --  Mode: --  Orthostatic VS  01-03-22 @ 19:40  Lying BP: --/-- HR: --  Sitting BP: 127/64 HR: 85  Standing BP: 128/89 HR: 90  Site: upper left arm  Mode: electronic  
Vital Signs Last 24 Hrs  T(C): 36.2 (01-04-22 @ 08:43), Max: 36.3 (01-03-22 @ 19:40)  T(F): 97.2 (01-04-22 @ 08:43), Max: 97.3 (01-03-22 @ 19:40)  HR: --  BP: --  BP(mean): --  RR: --  SpO2: 100% (01-04-22 @ 08:43) (98% - 100%)    Orthostatic VS  01-04-22 @ 08:43  Lying BP: --/-- HR: --  Sitting BP: 148/96 HR: 106  Standing BP: 114/85 HR: 120  Site: --  Mode: --  Orthostatic VS  01-03-22 @ 19:40  Lying BP: --/-- HR: --  Sitting BP: 127/64 HR: 85  Standing BP: 128/89 HR: 90  Site: upper left arm  Mode: electronic  Orthostatic VS  01-02-22 @ 20:01  Lying BP: --/-- HR: --  Sitting BP: 111/61 HR: 85  Standing BP: 120/70 HR: 90  Site: upper left arm  Mode: electronic  
Vital Signs Last 24 Hrs  T(C): 36.8 (01-12-22 @ 06:45), Max: 36.8 (01-11-22 @ 17:43)  T(F): 98.3 (01-12-22 @ 06:45), Max: 98.3 (01-11-22 @ 17:43)  HR: 78 (01-12-22 @ 06:45) (78 - 78)  BP: 134/77 (01-12-22 @ 06:45) (134/77 - 134/77)  BP(mean): --  RR: --  SpO2: 100% (01-12-22 @ 06:45) (100% - 100%)    
Vital Signs Last 24 Hrs  T(C): 36.7 (01-06-22 @ 08:27), Max: 36.7 (01-05-22 @ 19:50)  T(F): 98.1 (01-06-22 @ 08:27), Max: 98.1 (01-05-22 @ 19:50)  HR: 93 (01-06-22 @ 08:27) (93 - 93)  BP: 123/66 (01-06-22 @ 08:27) (123/66 - 123/66)  BP(mean): --  RR: 100 (01-06-22 @ 08:27) (100 - 100)  SpO2: --    Orthostatic VS  01-05-22 @ 08:15  Lying BP: --/-- HR: --  Sitting BP: 127/73 HR: 82  Standing BP: 108/80 HR: 94  Site: --  Mode: --  Orthostatic VS  01-04-22 @ 19:11  Lying BP: --/-- HR: --  Sitting BP: 124/74 HR: 482  Standing BP: 130/73 HR: 92  Site: --  Mode: --  
Vital Signs Last 24 Hrs  T(C): 36.7 (01-07-22 @ 08:11), Max: 36.7 (01-07-22 @ 08:11)  T(F): 98 (01-07-22 @ 08:11), Max: 98 (01-07-22 @ 08:11)  HR: 102 (01-07-22 @ 08:11) (102 - 102)  BP: 108/92 (01-07-22 @ 08:11) (108/92 - 108/92)  BP(mean): --  RR: --  SpO2: 100% (01-07-22 @ 08:11) (100% - 100%)    
Vital Signs Last 24 Hrs  T(C): 36.8 (01-12-22 @ 06:45), Max: 36.8 (01-11-22 @ 17:43)  T(F): 98.3 (01-12-22 @ 06:45), Max: 98.3 (01-11-22 @ 17:43)  HR: 78 (01-12-22 @ 06:45) (78 - 78)  BP: 134/77 (01-12-22 @ 06:45) (134/77 - 134/77)  BP(mean): --  RR: --  SpO2: 100% (01-12-22 @ 06:45) (100% - 100%)    
Vital Signs Last 24 Hrs  T(C): 36.8 (01-10-22 @ 06:28), Max: 36.8 (01-10-22 @ 06:28)  T(F): 98.2 (01-10-22 @ 06:28), Max: 98.2 (01-10-22 @ 06:28)  HR: 100 (01-10-22 @ 06:28) (100 - 100)  BP: 115/70 (01-10-22 @ 06:28) (115/70 - 115/70)  BP(mean): --  RR: --  SpO2: 100% (01-10-22 @ 06:28) (100% - 100%)

## 2022-01-12 NOTE — BH INPATIENT PSYCHIATRY PROGRESS NOTE - NSBHCONSDANGEROTHERS_PSY_A_CORE
assaultive behavior

## 2022-01-12 NOTE — BH INPATIENT PSYCHIATRY PROGRESS NOTE - NSICDXBHSECONDARYDX_PSY_ALL_CORE
2019 novel coronavirus disease (COVID-19)   U07.1  History of breast surgery   Z98.890  

## 2022-01-12 NOTE — BH INPATIENT PSYCHIATRY PROGRESS NOTE - NSTXMEDICINTERMD_PSY_ALL_CORE
Build therapeutic alliance, psychotherapy, psychoeducation

## 2022-01-12 NOTE — BH INPATIENT PSYCHIATRY PROGRESS NOTE - NSBHFUPINTERVALHXFT_PSY_A_CORE
Chart reviewed. Case discussed with interdisciplinary team. Patient seen and examined for follow up of hemant. No acute overnight events. Compliant with standing medication. Used PRN benzocaine lozenge, diphenhydramine, loratidine, and nicotine gum yesterday. Per nursing she is more organized. Per sleep log, slept throughout the night.    Patient seen this morning in day room by writer and treatment team. She reports that she feels "clear minded" and is looking forward to discharge, particularly returning home to spend time with and cook for her grandmother. Reflects on progress over course of hospitalization and identifies reduction in "outrageous" behavior and states feels more like herself. Reports challenges communicating about her diagnosis and her behavior with her family, support and coping mechanisms provided. Agreeable to PHP after hospitalization. Discussed plans for maintaining health outpatient, includign sleep, exercise, diet, medication adherence, treatment adherence (PHP), and involving her support system in managing her health.     Patient slightly pressured on interview today, more so than on interview yesterday, speaks at length about family dynamics and somewhat difficult to interrupt. However thought process is goal-directed, patient is future oriented, patient is aware of and in agreement with treatment plan and has close follow up and strong support network, no contraindication for discharge.     Denies SI/HI. Denies AVH.     Good appetite and PO intake. Sleeping well. No side effects to medications reported.

## 2022-01-12 NOTE — BH INPATIENT PSYCHIATRY PROGRESS NOTE - NSTXMEDICPROGRES_PSY_ALL_CORE
Met - goal discontinued
Improving
No Change
Improving
Met - goal discontinued
Improving
No Change
No Change
Met - goal discontinued

## 2022-01-12 NOTE — BH INPATIENT PSYCHIATRY PROGRESS NOTE - NSTXMEDICDATEEST_PSY_ALL_CORE
06-Jan-2022
10-Giorgio-2022
03-Jan-2022
10-Giorgio-2022
03-Jan-2022

## 2022-01-14 ENCOUNTER — OUTPATIENT (OUTPATIENT)
Dept: OUTPATIENT SERVICES | Facility: HOSPITAL | Age: 22
LOS: 1 days | Discharge: HOME | End: 2022-01-14
Payer: COMMERCIAL

## 2022-01-14 DIAGNOSIS — F31.13 BIPOLAR DISORDER, CURRENT EPISODE MANIC WITHOUT PSYCHOTIC FEATURES, SEVERE: ICD-10-CM

## 2022-01-14 DIAGNOSIS — Z98.890 OTHER SPECIFIED POSTPROCEDURAL STATES: Chronic | ICD-10-CM

## 2022-01-14 PROCEDURE — 90792 PSYCH DIAG EVAL W/MED SRVCS: CPT

## 2022-01-24 ENCOUNTER — INPATIENT (INPATIENT)
Facility: HOSPITAL | Age: 22
LOS: 13 days | Discharge: HOME | End: 2022-02-07
Attending: STUDENT IN AN ORGANIZED HEALTH CARE EDUCATION/TRAINING PROGRAM | Admitting: PSYCHIATRY & NEUROLOGY
Payer: COMMERCIAL

## 2022-01-24 VITALS
SYSTOLIC BLOOD PRESSURE: 145 MMHG | TEMPERATURE: 98 F | OXYGEN SATURATION: 97 % | DIASTOLIC BLOOD PRESSURE: 91 MMHG | HEART RATE: 110 BPM | RESPIRATION RATE: 16 BRPM | HEIGHT: 62 IN

## 2022-01-24 DIAGNOSIS — Z98.890 OTHER SPECIFIED POSTPROCEDURAL STATES: Chronic | ICD-10-CM

## 2022-01-24 LAB
ALBUMIN SERPL ELPH-MCNC: 3.6 G/DL — SIGNIFICANT CHANGE UP (ref 3.3–5)
ALP SERPL-CCNC: 75 U/L — SIGNIFICANT CHANGE UP (ref 40–120)
ALT FLD-CCNC: 18 U/L — SIGNIFICANT CHANGE UP (ref 4–33)
ANION GAP SERPL CALC-SCNC: 12 MMOL/L — SIGNIFICANT CHANGE UP (ref 7–14)
APAP SERPL-MCNC: <10 UG/ML — LOW (ref 15–25)
AST SERPL-CCNC: 27 U/L — SIGNIFICANT CHANGE UP (ref 4–32)
BASOPHILS # BLD AUTO: 0.01 K/UL — SIGNIFICANT CHANGE UP (ref 0–0.2)
BASOPHILS NFR BLD AUTO: 0.3 % — SIGNIFICANT CHANGE UP (ref 0–2)
BILIRUB SERPL-MCNC: 0.2 MG/DL — SIGNIFICANT CHANGE UP (ref 0.2–1.2)
BUN SERPL-MCNC: 12 MG/DL — SIGNIFICANT CHANGE UP (ref 7–23)
CALCIUM SERPL-MCNC: 8.7 MG/DL — SIGNIFICANT CHANGE UP (ref 8.4–10.5)
CHLORIDE SERPL-SCNC: 105 MMOL/L — SIGNIFICANT CHANGE UP (ref 98–107)
CO2 SERPL-SCNC: 20 MMOL/L — LOW (ref 22–31)
CREAT SERPL-MCNC: 0.78 MG/DL — SIGNIFICANT CHANGE UP (ref 0.5–1.3)
EOSINOPHIL # BLD AUTO: 0.04 K/UL — SIGNIFICANT CHANGE UP (ref 0–0.5)
EOSINOPHIL NFR BLD AUTO: 1 % — SIGNIFICANT CHANGE UP (ref 0–6)
ETHANOL SERPL-MCNC: <10 MG/DL — SIGNIFICANT CHANGE UP
FLUAV AG NPH QL: SIGNIFICANT CHANGE UP
FLUBV AG NPH QL: SIGNIFICANT CHANGE UP
GLUCOSE SERPL-MCNC: 87 MG/DL — SIGNIFICANT CHANGE UP (ref 70–99)
HCG SERPL-ACNC: <5 MIU/ML — SIGNIFICANT CHANGE UP
HCT VFR BLD CALC: 35.8 % — SIGNIFICANT CHANGE UP (ref 34.5–45)
HGB BLD-MCNC: 10.9 G/DL — LOW (ref 11.5–15.5)
IANC: 2.25 K/UL — SIGNIFICANT CHANGE UP (ref 1.5–8.5)
IMM GRANULOCYTES NFR BLD AUTO: 0.3 % — SIGNIFICANT CHANGE UP (ref 0–1.5)
LYMPHOCYTES # BLD AUTO: 1.31 K/UL — SIGNIFICANT CHANGE UP (ref 1–3.3)
LYMPHOCYTES # BLD AUTO: 32.9 % — SIGNIFICANT CHANGE UP (ref 13–44)
MCHC RBC-ENTMCNC: 27.9 PG — SIGNIFICANT CHANGE UP (ref 27–34)
MCHC RBC-ENTMCNC: 30.4 GM/DL — LOW (ref 32–36)
MCV RBC AUTO: 91.6 FL — SIGNIFICANT CHANGE UP (ref 80–100)
MONOCYTES # BLD AUTO: 0.36 K/UL — SIGNIFICANT CHANGE UP (ref 0–0.9)
MONOCYTES NFR BLD AUTO: 9 % — SIGNIFICANT CHANGE UP (ref 2–14)
NEUTROPHILS # BLD AUTO: 2.25 K/UL — SIGNIFICANT CHANGE UP (ref 1.8–7.4)
NEUTROPHILS NFR BLD AUTO: 56.5 % — SIGNIFICANT CHANGE UP (ref 43–77)
NRBC # BLD: 0 /100 WBCS — SIGNIFICANT CHANGE UP
NRBC # FLD: 0 K/UL — SIGNIFICANT CHANGE UP
PLATELET # BLD AUTO: 299 K/UL — SIGNIFICANT CHANGE UP (ref 150–400)
POTASSIUM SERPL-MCNC: 3.9 MMOL/L — SIGNIFICANT CHANGE UP (ref 3.5–5.3)
POTASSIUM SERPL-SCNC: 3.9 MMOL/L — SIGNIFICANT CHANGE UP (ref 3.5–5.3)
PROT SERPL-MCNC: 7.9 G/DL — SIGNIFICANT CHANGE UP (ref 6–8.3)
RBC # BLD: 3.91 M/UL — SIGNIFICANT CHANGE UP (ref 3.8–5.2)
RBC # FLD: 14 % — SIGNIFICANT CHANGE UP (ref 10.3–14.5)
RSV RNA NPH QL NAA+NON-PROBE: SIGNIFICANT CHANGE UP
SALICYLATES SERPL-MCNC: <0.3 MG/DL — LOW (ref 15–30)
SARS-COV-2 RNA SPEC QL NAA+PROBE: SIGNIFICANT CHANGE UP
SODIUM SERPL-SCNC: 137 MMOL/L — SIGNIFICANT CHANGE UP (ref 135–145)
TOXICOLOGY SCREEN, DRUGS OF ABUSE, SERUM RESULT: SIGNIFICANT CHANGE UP
TSH SERPL-MCNC: 2.07 UIU/ML — SIGNIFICANT CHANGE UP (ref 0.5–4.3)
WBC # BLD: 3.98 K/UL — SIGNIFICANT CHANGE UP (ref 3.8–10.5)
WBC # FLD AUTO: 3.98 K/UL — SIGNIFICANT CHANGE UP (ref 3.8–10.5)

## 2022-01-24 PROCEDURE — 99285 EMERGENCY DEPT VISIT HI MDM: CPT

## 2022-01-24 PROCEDURE — 93010 ELECTROCARDIOGRAM REPORT: CPT

## 2022-01-24 PROCEDURE — 99285 EMERGENCY DEPT VISIT HI MDM: CPT | Mod: 25

## 2022-01-24 RX ORDER — DIPHENHYDRAMINE HCL 50 MG
50 CAPSULE ORAL ONCE
Refills: 0 | Status: DISCONTINUED | OUTPATIENT
Start: 2022-01-25 | End: 2022-02-07

## 2022-01-24 RX ORDER — HALOPERIDOL DECANOATE 100 MG/ML
5 INJECTION INTRAMUSCULAR EVERY 6 HOURS
Refills: 0 | Status: DISCONTINUED | OUTPATIENT
Start: 2022-01-25 | End: 2022-02-07

## 2022-01-24 RX ORDER — QUETIAPINE FUMARATE 200 MG/1
200 TABLET, FILM COATED ORAL ONCE
Refills: 0 | Status: COMPLETED | OUTPATIENT
Start: 2022-01-24 | End: 2022-01-24

## 2022-01-24 RX ORDER — HALOPERIDOL DECANOATE 100 MG/ML
5 INJECTION INTRAMUSCULAR ONCE
Refills: 0 | Status: DISCONTINUED | OUTPATIENT
Start: 2022-01-25 | End: 2022-02-07

## 2022-01-24 RX ORDER — HALOPERIDOL DECANOATE 100 MG/ML
5 INJECTION INTRAMUSCULAR ONCE
Refills: 0 | Status: COMPLETED | OUTPATIENT
Start: 2022-01-24 | End: 2022-01-24

## 2022-01-24 RX ORDER — ALBUTEROL 90 UG/1
1 AEROSOL, METERED ORAL EVERY 6 HOURS
Refills: 0 | Status: DISCONTINUED | OUTPATIENT
Start: 2022-01-24 | End: 2022-01-25

## 2022-01-24 RX ORDER — DIPHENHYDRAMINE HCL 50 MG
50 CAPSULE ORAL EVERY 6 HOURS
Refills: 0 | Status: DISCONTINUED | OUTPATIENT
Start: 2022-01-25 | End: 2022-02-07

## 2022-01-24 RX ORDER — QUETIAPINE FUMARATE 200 MG/1
300 TABLET, FILM COATED ORAL ONCE
Refills: 0 | Status: COMPLETED | OUTPATIENT
Start: 2022-01-24 | End: 2022-01-24

## 2022-01-24 RX ADMIN — QUETIAPINE FUMARATE 300 MILLIGRAM(S): 200 TABLET, FILM COATED ORAL at 23:29

## 2022-01-24 RX ADMIN — QUETIAPINE FUMARATE 200 MILLIGRAM(S): 200 TABLET, FILM COATED ORAL at 23:29

## 2022-01-24 NOTE — ED PROVIDER NOTE - PROGRESS NOTE DETAILS
Rec'd signout on this pt from RONALDO Landaverde:  22yo F, nursing student, PPH of psychosis, p/w hemant and acute psychosis, took po meds here, currently boarding pending psych admission.   -Dr. Michel Pt slightly sleepy in nad- with PES at side.  States has asthma- lungs are cta in no respiratory distress.  Awaiting bed for admission.

## 2022-01-24 NOTE — ED BEHAVIORAL HEALTH NOTE - BEHAVIORAL HEALTH NOTE
COVID Exposure Screen- collateral (i.e. third-party, chart review, belongings, etc; include EMS and ED staff)  1.	*Has the patient had a COVID-19 test in the last 90 days?  ( x ) Yes   (  ) No   (  ) Unknown- Reason: _____  IF YES PROCEED TO QUESTION #2. IF NO OR UNKNOWN, PLEASE SKIP TO QUESTION #3.  2.	Date of test(s) and result(s): __Jan 2022 - positive______  3.	*Has the patient tested positive for COVID-19 antibodies? (x  ) Yes   (  ) No   (  ) Unknown- Reason: _____  IF YES PROCEED TO QUESTION #4. IF NO or UNKNOWN, PLEASE SKIP TO QUESTION #5.  4.	Date of positive antibody test: ___1/2/22_____  5.	*Has the patient received 2 doses of the COVID-19 vaccine? (  ) Yes   ( x ) No   (  ) Unknown- Reason: received one dose on 1/20/22_____  IF YES PROCEED TO QUESTION #6. IF NO or UNKNOWN, PLEASE SKIP TO QUESTION #7.  6.	 Date of second dose: ________  7.	*In the past 10 days, has the patient been around anyone with a positive COVID-19 test?* (  ) Yes   ( x ) No   (  ) Unknown- Reason: __  IF YES PROCEED TO QUESTION #8. IF NO or UNKNOWN, PLEASE SKIP TO QUESTION #13.  8.	Was the patient within 6 feet of them for at least 15 minutes? (  ) Yes   (  ) No   (  ) Unknown- Reason: _____  9.	Did the patient provide care for them? (  ) Yes   (  ) No   (  ) Unknown- Reason: ______  10.	Did the patient have direct physical contact with them (touched, hugged, or kissed them)? (  ) Yes   (  ) No    (  ) Unknown- Reason: __  11.	Did the patient share eating or drinking utensils with them? (  ) Yes   (  ) No    (  ) Unknown- Reason: ____  12.	Did they sneeze, cough, or somehow get respiratory droplets on the patient? (  ) Yes   (  ) No    (  ) Unknown- Reason: ______  13.	*Has the patient been out of [place] within the past 10 days?* (  ) Yes   ( x ) No   (  ) Unknown- Reason: _____  IF YES PLEASE ANSWER THE FOLLOWING QUESTIONS:  14.	Which state/country did they go to? ______  15.	Were they there over 24 hours? (  ) Yes   (  ) No    (  ) Unknown- Reason: ______  16.	Date of return to [facility]: ______

## 2022-01-24 NOTE — ED BEHAVIORAL HEALTH ASSESSMENT NOTE - CASE SUMMARY
21/F with hx of bipolar disorder with PF, multiple past psych admissions including most recent [facility] discharge x 12 days ago, no reported hx of SA, and hx of alcohol/ cannabis abuse.  Today, presented to the ED BIB parents due to worsening agitated behavior in the context of a manic episode.    at this time, she is disorganized in TP (FOI, tangential, illogical/ impaired reasoning), is severely affectively dysregulated and remains unpredictable, harboring SI, delusional, with poor insight and impaired judgement.  Collateral information was obtained from Pt's family who raised concerns that the Pt has significantly decompensated - they report of Pt being delusional, manifesting bizarre behaviors as well as engaged in reckless/ impulsive behaviors.  Last night, Pt allegedly took father's medications stating that "she wanted to commit suicide".   Pt's current symptoms have caused severe functional impairment.  Pt currently poses a threat to herself and to others given ongoing manic episode.  she is unable to partake towards safety planning.  Pt will benefit from re-admission to in-Pt psych unit for further stabilization and ensuring safety     RECOMMENDATIONS:   1. for now, to continue with: Seroquel 500mg HS and Klonopin 1mg HS.    2. PRNs: Haldol 5mg PO/IM + Ativan 2mg PO/IM + benadryl 50mg PO/IM q6Hrs for psychotic agitation   3. for now, to temporarily board here at the  ED as no beds are available  - discussed with  ED staff   4. once medically cleared and with bed, to facilitate transfer to [facility] on involuntary status

## 2022-01-24 NOTE — ED BEHAVIORAL HEALTH ASSESSMENT NOTE - SUMMARY
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     Patient is unable to participate in full interview. However, based on patient's clearly manic presentation (irritable, elevated mood, tangential, grandiosity, easily distracted) as well as collateral report (reckless behavior, insomnia and increased energy, labile mood, suicidal thought), she meets criteria for involuntary hospitalization due to florid hemant and resulting inability to look after self and risk of harm to self and others. 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     Patient is unable to participate in full interview. However, based on patient's clearly manic presentation (irritable, elevated mood, tangential, grandiosity, easily distracted) as well as collateral report (reckless behavior, delusional thought, insomnia and increased energy, labile mood, suicidal thought), she meets criteria for involuntary hospitalization due to florid hemant and resulting inability to look after self and risk of harm to self and others.

## 2022-01-24 NOTE — ED PROVIDER NOTE - NS ED MD TWO NIGHTS YN
Bee Krishnamurthy is a 23year old female.     Chief complaint:  Cold intolerance   HPI:   23year old female with PMH as listed below here for   Cold intolerance   Patient reports feeling cold even when hot weather   Had iron def anemia when she was pregnan SpO2 100%   BMI 25.69 kg/m²   GENERAL: well developed, well nourished,in no apparent distress  SKIN: no rashes,no suspicious lesions  HEENT: atraumatic, normocephalic,ears and throat are clear  NECK: supple,no adenopathy  LUNGS: clear to auscultation  CAR FREE T4 (FREE THYROXINE)  - TRIIODOTHYRONINE (T3) TOTAL  - THYROID PEROXIDASE (TPO) AB  - CBC WITH DIFFERENTIAL WITH PLATELET;  Future  - CBC WITH DIFFERENTIAL WITH PLATELET  - CBC W/ DIFFERENTIAL  Please return to the clinic if you are having persistent or Yes

## 2022-01-24 NOTE — ED ADULT NURSE NOTE - NS PRO AD NO ADVANCE DIRECTIVE
Prednisone Pregnancy And Lactation Text: This medication is Pregnancy Category C and it isn't know if it is safe during pregnancy. This medication is excreted in breast milk. No

## 2022-01-24 NOTE — ED ADULT NURSE NOTE - OBJECTIVE STATEMENT
Pt from home. Brought in by family for hemant, reckless behavior, and delusions. pt disorganized, delusional, and presents with odd behavior. Pt belongings accounted for. Awaiting MD evaluation.

## 2022-01-24 NOTE — ED ADULT TRIAGE NOTE - CHIEF COMPLAINT QUOTE
Pt presents to ED ambulatory from home with c/o manic behavior. Pt's sister reports bizarre behavior x several days. pt has hx of Bipolar disorder and has been compliant with medications with multiple inpatient admissions. Pt was seen by PMD and advised to come to ED for further eval. Rodrigo Recinosdarrell (father) [phone] Milka Saint-Vile (sister) [phone].

## 2022-01-24 NOTE — ED ADULT NURSE REASSESSMENT NOTE - NS ED NURSE REASSESS COMMENT FT1
Pt remains awake, labile and intrusive with others. Pt yelling at other patients and remains hyperverbal with tangential TP and not easily redirected. Pt knocking repeatedly on nursing station window and demanding to leave hospital. Pt received HS meds as ordered with little effect. Kemal np made aware, pt medicated as ordered. Will continue to monitor for safety and therapeutic effect.  VSS, even unlabored respirations observed.
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
## 2022-01-24 NOTE — ED BEHAVIORAL HEALTH ASSESSMENT NOTE - OTHER PAST PSYCHIATRIC HISTORY (INCLUDE DETAILS REGARDING ONSET, COURSE OF ILLNESS, INPATIENT/OUTPATIENT TREATMENT)
2 prior psych admissions in May 2020 in [place] for manic episode and psychosis, 2 recent prior psych admissions at [facility] Dec 2021 and Jan 2022 for hemant.  no past suicidal ideation, no past SA  Had intake at [facility] on 1/14.  recently seen in ED 12/29

## 2022-01-24 NOTE — ED BEHAVIORAL HEALTH ASSESSMENT NOTE - DETAILS
nausea family is notified e-mailed Drs. Mendelowitz and [facility] "weight gain" and Zyprexa "I did not like it" per collateral, pt reported SI last night. However, no SI on exam today. anxiety/depression in family 1S per transfer protocol Dr. Davidson 2W

## 2022-01-24 NOTE — ED ADULT NURSE NOTE - CHIEF COMPLAINT QUOTE
Pt presents to ED ambulatory from home with c/o manic behavior. Pt's sister reports bizarre behavior x several days. pt has hx of Bipolar disorder and has been compliant with medications with multiple inpatient admissions. Pt was seen by PMD and advised to come to ED for further eval. Rodrigo Recinosdarrell (father) [phone] Milka Saint-Vile (sister) [phone].

## 2022-01-24 NOTE — ED ADULT NURSE REASSESSMENT NOTE - NS ED NURSE REASSESS COMMENT FT1
Received pt and report at change of shift. Pt is awake, pacing hallway. a&ox3, cooperative with hyperverbal speech and tangential TP. PO hydration provided and tolerated, labs drawn, results and MC pending. Will continue to monitor for safety.

## 2022-01-24 NOTE — ED BEHAVIORAL HEALTH ASSESSMENT NOTE - RISK ASSESSMENT
Moderate Acute Suicide Risk Protective factors: no history of suicide attempt, supportive family, engaged in school, connected to treatment. Risk factors: hemant, poor judgment, non adherence with medications, impulsivity

## 2022-01-24 NOTE — ED ADULT NURSE NOTE - NSIMPLEMENTINTERV_GEN_ALL_ED
Implemented All Universal Safety Interventions:  [place] to call system. Call bell, personal items and telephone within reach. Instruct patient to call for assistance. Room bathroom lighting operational. Non-slip footwear when patient is off stretcher. Physically safe environment: no spills, clutter or unnecessary equipment. Stretcher in lowest position, wheels locked, appropriate side rails in place.

## 2022-01-24 NOTE — ED BEHAVIORAL HEALTH ASSESSMENT NOTE - PSYCHIATRIC ISSUES AND PLAN (INCLUDE STANDING AND PRN MEDICATION)
continue quetiapine 500 mg QHS, klonopin 1mg hs, PRN haloperidol/lorazepam/diphenhydramine 5 mg / 2 mg / 50 mg PO/IM for

## 2022-01-24 NOTE — ED BEHAVIORAL HEALTH ASSESSMENT NOTE - ELEVATED CHRONIC RISK
Yes
fall precautions/acute decreased handwriting legibility post-stroke ; Note: pt with h/o carpal tunnel syndrome B hands

## 2022-01-24 NOTE — ED BEHAVIORAL HEALTH ASSESSMENT NOTE - DESCRIPTION
in nursing school, resides with parents, in relationship with someone in [place] Vital Signs Last 24 Hrs  T(C): 36.9 (24 Jan 2022 18:45), Max: 36.9 (24 Jan 2022 18:45)  T(F): 98.5 (24 Jan 2022 18:45), Max: 98.5 (24 Jan 2022 18:45)  HR: 110 (24 Jan 2022 18:45) (110 - 110)  BP: 145/91 (24 Jan 2022 18:45) (145/91 - 145/91)  BP(mean): --  RR: 16 (24 Jan 2022 18:45) (16 - 16)  SpO2: 97% (24 Jan 2022 18:45) (97% - 97%) breast reduction surgery
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
## 2022-01-24 NOTE — ED BEHAVIORAL HEALTH ASSESSMENT NOTE - THOUGHT PROCESS
Tangential/Perseverative/Impaired reasoning Tangential/Perseverative/Illogical/Impaired reasoning/Other/Disorganized

## 2022-01-25 DIAGNOSIS — F33.9 MAJOR DEPRESSIVE DISORDER, RECURRENT, UNSPECIFIED: ICD-10-CM

## 2022-01-25 LAB
COVID-19 SPIKE DOMAIN AB INTERP: POSITIVE
COVID-19 SPIKE DOMAIN ANTIBODY RESULT: >250 U/ML — HIGH
SARS-COV-2 IGG+IGM SERPL QL IA: >250 U/ML — HIGH
SARS-COV-2 IGG+IGM SERPL QL IA: POSITIVE

## 2022-01-25 PROCEDURE — 99213 OFFICE O/P EST LOW 20 MIN: CPT

## 2022-01-25 RX ORDER — ALBUTEROL 90 UG/1
2 AEROSOL, METERED ORAL EVERY 6 HOURS
Refills: 0 | Status: DISCONTINUED | OUTPATIENT
Start: 2022-01-25 | End: 2022-02-07

## 2022-01-25 RX ORDER — CLONAZEPAM 1 MG
1 TABLET ORAL AT BEDTIME
Refills: 0 | Status: DISCONTINUED | OUTPATIENT
Start: 2022-01-25 | End: 2022-01-31

## 2022-01-25 RX ORDER — INFLUENZA VIRUS VACCINE 15; 15; 15; 15 UG/.5ML; UG/.5ML; UG/.5ML; UG/.5ML
0.5 SUSPENSION INTRAMUSCULAR ONCE
Refills: 0 | Status: DISCONTINUED | OUTPATIENT
Start: 2022-01-25 | End: 2022-02-07

## 2022-01-25 RX ORDER — QUETIAPINE FUMARATE 200 MG/1
500 TABLET, FILM COATED ORAL AT BEDTIME
Refills: 0 | Status: DISCONTINUED | OUTPATIENT
Start: 2022-01-25 | End: 2022-02-03

## 2022-01-25 RX ADMIN — Medication 50 MILLIGRAM(S): at 20:53

## 2022-01-25 RX ADMIN — QUETIAPINE FUMARATE 500 MILLIGRAM(S): 200 TABLET, FILM COATED ORAL at 20:53

## 2022-01-25 RX ADMIN — Medication 1 MILLIGRAM(S): at 20:53

## 2022-01-25 RX ADMIN — ALBUTEROL 2 PUFF(S): 90 AEROSOL, METERED ORAL at 20:53

## 2022-01-25 RX ADMIN — HALOPERIDOL DECANOATE 5 MILLIGRAM(S): 100 INJECTION INTRAMUSCULAR at 20:53

## 2022-01-25 NOTE — ED ADULT NURSE REASSESSMENT NOTE - NS ED NURSE REASSESS COMMENT FT1
Break Covering RN: Pt sleeping in bed in room 4 at this time. Respirations even and unlabored, no accessory muscle use. NAD noted at this time.

## 2022-01-25 NOTE — BH CONSULTATION LIAISON PROGRESS NOTE - MSE UNSTRUCTURED FT
Mental Status Exam:  · General Appearance	No deformities present  · Body Habitus	Average build  · Hygiene	Poor  · Grooming	Poor  · Behavior	Uncooperative  · Eye Contact	Fair  · Relatedness	Poor  · Impulse Control	Impaired  · Muscle Tone / Strength	Normal muscle tone/strength  · Abnormal Movements	No abnormal movements  · Gait / Station	Normal gait / station  · Speech	Soft  · Reported mood	Other  · Other	"very happy"  · Affect Quality	Elevated  Irritable  · Affect Range	Labile  · Affect Congruence	Congruent  · Thought Process	Disorganized/ Tangential/ Perseverative/ Illogical/ Impaired reasoning/ Other  	  · Other	FOI  · Thought Associations	Loose  · Thought Content	Delusions    · Perceptions	No abnormalities  · Orientation	Not fully oriented...  · Orientation Details	disoriented to situation  · Attention / Concentration	Impaired  · Estimated Intelligence	Average  · Recent Memory	Other  · Other	unable to assess  · Remote Memory	Other  · Other	unable to assess  · Fund of Knowledge	Normal  · Language	No abnormalities noted  · Judgment (regarding everyday events)	Poor  · Insight (regarding psychiatric illness)	Poor

## 2022-01-25 NOTE — BH CONSULTATION LIAISON PROGRESS NOTE - NSBHCHARTREVIEWVS_PSY_A_CORE FT
Vital Signs Last 24 Hrs  T(C): 36.3 (24 Jan 2022 23:54), Max: 36.9 (24 Jan 2022 18:45)  T(F): 97.4 (24 Jan 2022 23:54), Max: 98.5 (24 Jan 2022 18:45)  HR: 88 (24 Jan 2022 23:54) (88 - 110)  BP: 111/91 (24 Jan 2022 23:54) (111/91 - 145/91)  BP(mean): --  RR: 18 (24 Jan 2022 23:54) (16 - 18)  SpO2: 100% (24 Jan 2022 23:54) (97% - 100%)

## 2022-01-25 NOTE — BH CONSULTATION LIAISON PROGRESS NOTE - NSBHFUPINTERVALHXFT_PSY_A_CORE
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     Upon evaluation this AM patient noted to be singing to self. She appeared sedated but continued to sing and mumble to self. She appeared internally preoccupied.    Patient denies hemant, hypomanic or psychotic symptoms. Patient denies any depressive symptoms including depressed mood, anhedonia, changes in energy/concentration/appetite, sleep disturbances, preoccupation with death or feelings of guilt. Patient adamantly denies SI, intent or plan; denies any HI, violent thoughts.

## 2022-01-25 NOTE — BH PATIENT PROFILE - NSBHSNSOFSAFETY_PSY_A_CORE
being left alone/calling significant other/family member/drinking tea or coffee/journaling/listening to music/reading/taking a nap/taking some deep breaths/talking to someone

## 2022-01-25 NOTE — BH PATIENT PROFILE - HOME MEDICATIONS
QUEtiapine 300 mg oral tablet , Take 1 tab(s) orally once a day (at bedtime)     (take 1 200mg tab and 1 300mg tab nightly for a total of 500mg)  QUEtiapine 200 mg oral tablet , Take 1 tab(s) orally once a day (at bedtime)      (take 1 200mg tab and 1 300mg tab for a total dose of 500mg per night)  Multiple Vitamins oral tablet , 1 tab(s) orally once a day

## 2022-01-25 NOTE — BH PATIENT PROFILE - NSCMSPTSTRENGTHS_PSY_ALL_CORE
Able to adapt/Courageous/Expressive of emotions/[place]/spirituality/Financial stability/Intact employment/Intact family/Intelligence/Leisure interest/Motivated/Positive attitude/Self confidence/Self-reliant/Strong support system

## 2022-01-25 NOTE — BH CONSULTATION LIAISON PROGRESS NOTE - CURRENT MEDICATION
MEDICATIONS  (STANDING):    MEDICATIONS  (PRN):  ALBUTerol    90 MICROgram(s) HFA Inhaler 1 Puff(s) Inhalation every 6 hours PRN Shortness of Breath

## 2022-01-25 NOTE — BH CONSULTATION LIAISON PROGRESS NOTE - NSBHCONSULTPSYCHPLAN_PSY_A_CORE FT
continue quetiapine 500 mg QHS, klonopin 1mg hs, PRN haloperidol/lorazepam/diphenhydramine 5 mg / 2 mg / 50 mg PO/IM

## 2022-01-25 NOTE — BH PATIENT PROFILE - FALL HARM RISK - UNIVERSAL INTERVENTIONS
Bed in lowest position, wheels locked, appropriate side rails in place/Call bell, personal items and telephone in reach/Instruct patient to call for assistance before getting out of bed or chair/Non-slip footwear when patient is out of bed/[place] to call system/Physically safe environment - no spills, clutter or unnecessary equipment/Purposeful Proactive Rounding/Room/bathroom lighting operational, light cord in reach

## 2022-01-25 NOTE — BH CONSULTATION LIAISON PROGRESS NOTE - NSBHASSESSMENTFT_PSY_ALL_CORE
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     Patient is unable to participate in full interview but continues to present as disorganized and manic.  Per collateral report patient has been displaying reckless behavior, delusional thought, insomnia and increased energy, labile mood, suicidal thought, she meets criteria for involuntary hospitalization due to florid hemant and resulting inability to look after self and risk of harm to self and others.

## 2022-01-26 PROCEDURE — 99222 1ST HOSP IP/OBS MODERATE 55: CPT

## 2022-01-26 RX ORDER — CARBAMIDE PEROXIDE 81.86 MG/ML
4 SOLUTION/ DROPS AURICULAR (OTIC)
Refills: 0 | Status: COMPLETED | OUTPATIENT
Start: 2022-01-26 | End: 2022-01-30

## 2022-01-26 RX ORDER — SODIUM CHLORIDE 0.65 %
1 AEROSOL, SPRAY (ML) NASAL
Refills: 0 | Status: DISCONTINUED | OUTPATIENT
Start: 2022-01-26 | End: 2022-02-07

## 2022-01-26 RX ORDER — NICOTINE POLACRILEX 2 MG
1 GUM BUCCAL DAILY
Refills: 0 | Status: DISCONTINUED | OUTPATIENT
Start: 2022-01-26 | End: 2022-02-07

## 2022-01-26 RX ORDER — NICOTINE POLACRILEX 2 MG
4 GUM BUCCAL EVERY 4 HOURS
Refills: 0 | Status: DISCONTINUED | OUTPATIENT
Start: 2022-01-26 | End: 2022-02-07

## 2022-01-26 RX ADMIN — HALOPERIDOL DECANOATE 5 MILLIGRAM(S): 100 INJECTION INTRAMUSCULAR at 10:11

## 2022-01-26 RX ADMIN — CARBAMIDE PEROXIDE 4 DROP(S): 81.86 SOLUTION/ DROPS AURICULAR (OTIC) at 22:18

## 2022-01-26 RX ADMIN — Medication 4 MILLIGRAM(S): at 12:42

## 2022-01-26 RX ADMIN — Medication 1 PATCH: at 20:07

## 2022-01-26 RX ADMIN — Medication 1 SPRAY(S): at 13:28

## 2022-01-26 RX ADMIN — Medication 50 MILLIGRAM(S): at 10:10

## 2022-01-26 RX ADMIN — Medication 1 MILLIGRAM(S): at 20:31

## 2022-01-26 RX ADMIN — Medication 1 PATCH: at 12:41

## 2022-01-26 RX ADMIN — QUETIAPINE FUMARATE 500 MILLIGRAM(S): 200 TABLET, FILM COATED ORAL at 20:31

## 2022-01-26 RX ADMIN — Medication 2 MILLIGRAM(S): at 10:11

## 2022-01-26 RX ADMIN — Medication 1 DROP(S): at 13:28

## 2022-01-26 RX ADMIN — ALBUTEROL 2 PUFF(S): 90 AEROSOL, METERED ORAL at 12:38

## 2022-01-26 NOTE — BH INPATIENT PSYCHIATRY ASSESSMENT NOTE - CASE SUMMARY
21/F with hx of bipolar disorder with PF, multiple past psych admissions including most recent [facility] discharge x 12 days ago, no reported hx of SA, and hx of alcohol/ cannabis abuse.  Today, presented to the ED BIB parents due to worsening agitated behavior in the context of a manic episode.  Patient is disorganized in TP ( tangential, illogical/ impaired reasoning), is severely affectively dysregulated and remains unpredictable, harboring SI, delusional, with poor insight and impaired judgement.  Collateral information was obtained from Pt's family who raised concerns that the Pt has significantly decompensated - they report of Pt being delusional, manifesting bizarre behaviors as well as engaged in reckless/ impulsive behaviors.  Last night, Pt allegedly took father's medications stating that "she wanted to commit suicide".   Pt's current symptoms have caused severe functional impairment and at this time she requires continued inpatient hospitalization for safety and stabilization.  Agree with plan as stated.

## 2022-01-26 NOTE — BH INPATIENT PSYCHIATRY ASSESSMENT NOTE - OTHER
Add 01854 Cpt? (Important Note: In 2017 The Use Of 22954 Is Being Tracked By Cms To Determine Future Global Period Reimbursement For Global Periods): no Detail Level: Detailed ED records, last d/c summary

## 2022-01-26 NOTE — PSYCHIATRIC REHAB INITIAL EVALUATION - NSBHSTRENGTHHOME_PSY_ALL_CORE
Patient identified spirituality and doing activities as a family as strengths of her home environment.

## 2022-01-26 NOTE — PSYCHIATRIC REHAB INITIAL EVALUATION - LIVES WITH
Patient reported living with her grandma, parents, brother, sister, and goddaughter./grandparent(s)/parent(s)/sibling(s)

## 2022-01-26 NOTE — BH INPATIENT PSYCHIATRY ASSESSMENT NOTE - NSTXMANICGOAL_PSY_ALL_CORE
Be able to identify the early signs of hemant (e.g. sleep and mood changes) and to employ coping strategies to minimize acting out

## 2022-01-26 NOTE — BH SOCIAL WORK INITIAL PSYCHOSOCIAL EVALUATION - NSBHABUSESEXHX_PSY_ALL_CORE
LOV 9/8/17, hypogonadism, due for F/U in 9/2018. Okay to send RF for 6 months and then pt due for f/u. Verbal order for RX given to [facility] pharmacist.    No

## 2022-01-26 NOTE — BH INPATIENT PSYCHIATRY ASSESSMENT NOTE - OTHER PAST PSYCHIATRIC HISTORY (INCLUDE DETAILS REGARDING ONSET, COURSE OF ILLNESS, INPATIENT/OUTPATIENT TREATMENT)
2 prior psych admissions in May 2020 in [place] for manic episode and psychosis, 2 recent prior psych admissions at [facility] Dec 2021 and Jan 2022 for hemant.  no past suicidal ideation, no past SA  Had intake at [facility] on 1/14.  recently seen in ED 12/29
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
## 2022-01-26 NOTE — BH INPATIENT PSYCHIATRY ASSESSMENT NOTE - CURRENT MEDICATION
MEDICATIONS  (STANDING):  carbamide peroxide Otic Solution 4 Drop(s) Both Ears two times a day  clonazePAM  Tablet 1 milliGRAM(s) Oral at bedtime  influenza   Vaccine 0.5 milliLiter(s) IntraMuscular once  nicotine -   7 mG/24Hr(s) Patch 1 patch Transdermal daily  QUEtiapine 500 milliGRAM(s) Oral at bedtime    MEDICATIONS  (PRN):  ALBUTerol    90 MICROgram(s) HFA Inhaler 2 Puff(s) Inhalation every 6 hours PRN Shortness of Breath and/or Wheezing  artificial  tears Solution 1 Drop(s) Both EYES four times a day PRN dry eys  diphenhydrAMINE 50 milliGRAM(s) Oral every 6 hours PRN anxiety, agitation  diphenhydrAMINE Injectable 50 milliGRAM(s) IntraMuscular once PRN severe anxiety, agitation  haloperidol     Tablet 5 milliGRAM(s) Oral every 6 hours PRN agitation, anxiety  haloperidol    Injectable 5 milliGRAM(s) IntraMuscular Once PRN severe agitation, severe anxiety  LORazepam     Tablet 2 milliGRAM(s) Oral every 6 hours PRN Anxiety  LORazepam   Injectable 2 milliGRAM(s) IntraMuscular Once PRN severe anxiety  nicotine  Polacrilex Gum 4 milliGRAM(s) Oral every 4 hours PRN Craving  sodium chloride 0.65% Nasal 1 Spray(s) Both Nostrils two times a day PRN Congestion

## 2022-01-26 NOTE — BH INPATIENT PSYCHIATRY ASSESSMENT NOTE - DETAILS
per collateral, pt reported SI last night. However, no SI on exam today. anxiety/depression in family Latuda "weight gain" and Zyprexa "I did not like it"

## 2022-01-26 NOTE — PSYCHIATRIC REHAB INITIAL EVALUATION - NSBHALCSUBCHOICE_PSY_ALL_CORE
Patient reported occasional marijuana use and daily nicotine use (i.e. e-cigarette 2-3 times per day).

## 2022-01-26 NOTE — BH INPATIENT PSYCHIATRY ASSESSMENT NOTE - RISK ASSESSMENT
Protective factors: no history of suicide attempt, supportive family, engaged in school, connected to treatment. Risk factors: hemant, poor judgment, non adherence with medications, impulsivity

## 2022-01-26 NOTE — PSYCHIATRIC REHAB INITIAL EVALUATION - NSBHEDULEVEL_PSY_ALL_CORE
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

## 2022-01-26 NOTE — PSYCHIATRIC REHAB INITIAL EVALUATION - NSBHEMPLOYED_PSY_ALL_CORE
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

## 2022-01-26 NOTE — BH INPATIENT PSYCHIATRY ASSESSMENT NOTE - NSBHCHARTREVIEWVS_PSY_A_CORE FT
Vital Signs Last 24 Hrs  T(C): 36.4 (01-25-22 @ 15:24), Max: 36.4 (01-25-22 @ 15:24)  T(F): 97.6 (01-25-22 @ 15:24), Max: 97.6 (01-25-22 @ 15:24)  HR: --  BP: --  BP(mean): --  RR: 17 (01-25-22 @ 15:24) (17 - 17)  SpO2: --    Orthostatic VS  01-26-22 @ 07:44  Lying BP: --/-- HR: --  Sitting BP: 129/76 HR: 88  Standing BP: 132/86 HR: 98  Site: upper left arm  Mode: --  Orthostatic VS  01-25-22 @ 15:24  Lying BP: --/-- HR: --  Sitting BP: 138/81 HR: 100  Standing BP: 131/90 HR: 98  Site: --  Mode: --   Vital Signs Last 24 Hrs  T(C): --  T(F): --  HR: --  BP: --  BP(mean): --  RR: --  SpO2: --    Orthostatic VS  01-26-22 @ 07:44  Lying BP: --/-- HR: --  Sitting BP: 129/76 HR: 88  Standing BP: 132/86 HR: 98  Site: upper left arm  Mode: --  Orthostatic VS  01-25-22 @ 15:24  Lying BP: --/-- HR: --  Sitting BP: 138/81 HR: 100  Standing BP: 131/90 HR: 98  Site: --  Mode: --

## 2022-01-26 NOTE — PSYCHIATRIC REHAB INITIAL EVALUATION - NSBHEDUATTENDANCE_PSY_ALL_CORE
Patient reported that she is on a leave of absence from school that was put in place by the [employer] program./Other

## 2022-01-26 NOTE — BH INPATIENT PSYCHIATRY ASSESSMENT NOTE - NSBHMETABOLIC_PSY_ALL_CORE_FT
BMI: BMI (kg/m2): 27.4 (01-25-22 @ 15:24)  HbA1c: A1C with Estimated Average Glucose Result: 5.3 % (01-04-22 @ 10:27)    Glucose:   BP: 105/65 (01-25-22 @ 12:44) (105/65 - 145/91)  Lipid Panel: Date/Time: 01-04-22 @ 10:27  Cholesterol, Serum: 142  Direct LDL: --  HDL Cholesterol, Serum: 51  Total Cholesterol/HDL Ration Measurement: --  Triglycerides, Serum: 61

## 2022-01-26 NOTE — PSYCHIATRIC REHAB INITIAL EVALUATION - NSBHPRRECOMMEND_PSY_ALL_CORE
Writer met with patient to orient her to psychiatric rehabilitation staff and services. Throughout the session, patient was cooperative, engaged, and forthcoming with personal history. Patient identified her reason for admission as getting a new asthma pump. Patient exhibited limited insight towards her reason for admission and was focused on immediate discharge. Patient denied SI, HI, AH, and VH. Patient denied any stressors related to her mental health. Patient requested a Quran and bible and stated the importance of spirituality as an effective coping skill. Writer established a treatment goal for the patient to work on over the next 7 days. Per chart, patient presents with recent [facility] admission (01/2022), manic episode, and delusional thought process. Psychiatric rehabilitation staff will provide encouragement, support, psychotherapy, and psychoeducation to assist the patient in the progression of her treatment goal.

## 2022-01-26 NOTE — BH INPATIENT PSYCHIATRY ASSESSMENT NOTE - NSBHASSESSSUMMFT_PSY_ALL_CORE
21/F with hx of bipolar disorder with PF, multiple past psych admissions including most recent [facility] discharge x 12 days ago, no reported hx of SA, and hx of alcohol/ cannabis abuse.  Today, presented to the ED BIB parents due to worsening agitated behavior in the context of a manic episode.  Patient is disorganized in TP ( tangential, illogical/ impaired reasoning), is severely affectively dysregulated and remains unpredictable, harboring SI, delusional, with poor insight and impaired judgement.  Collateral information was obtained from Pt's family who raised concerns that the Pt has significantly decompensated - they report of Pt being delusional, manifesting bizarre behaviors as well as engaged in reckless/ impulsive behaviors.  Last night, Pt allegedly took father's medications stating that "she wanted to commit suicide".   Pt's current symptoms have caused severe functional impairment.   1. Admit to inpatient, routine checks  2. c/w Seroquel 500mg hs and Klonopin 1mg hs  3. tx will include milieu, groups  4. disposition: collaborate with  and discharge will take place when clinically stable, consider PHP

## 2022-01-26 NOTE — BH INPATIENT PSYCHIATRY ASSESSMENT NOTE - NSBHMSEGROOM_PSY_A_CORE
Routed to provider for review. Pt given diclofenac at 7/2/19 OV.   Please advise to refill request Fair

## 2022-01-26 NOTE — PSYCHIATRIC REHAB INITIAL EVALUATION - PRIMARY ROLES/RESPONSIBILITIES
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

## 2022-01-26 NOTE — PSYCHIATRIC REHAB INITIAL EVALUATION - NSBHPRTOOLBOX_PSY_ALL_CORE
Patient identified spirituality, dance, and cheerleading as effective coping skills./Spirituality/Cheondoism/Other

## 2022-01-27 RX ORDER — LITHIUM CARBONATE 300 MG/1
450 TABLET, EXTENDED RELEASE ORAL
Refills: 0 | Status: DISCONTINUED | OUTPATIENT
Start: 2022-01-28 | End: 2022-01-31

## 2022-01-27 RX ORDER — LITHIUM CARBONATE 300 MG/1
450 TABLET, EXTENDED RELEASE ORAL ONCE
Refills: 0 | Status: COMPLETED | OUTPATIENT
Start: 2022-01-27 | End: 2022-01-27

## 2022-01-27 RX ADMIN — Medication 50 MILLIGRAM(S): at 20:37

## 2022-01-27 RX ADMIN — HALOPERIDOL DECANOATE 5 MILLIGRAM(S): 100 INJECTION INTRAMUSCULAR at 12:35

## 2022-01-27 RX ADMIN — CARBAMIDE PEROXIDE 4 DROP(S): 81.86 SOLUTION/ DROPS AURICULAR (OTIC) at 09:17

## 2022-01-27 RX ADMIN — CARBAMIDE PEROXIDE 4 DROP(S): 81.86 SOLUTION/ DROPS AURICULAR (OTIC) at 20:45

## 2022-01-27 RX ADMIN — Medication 1 DROP(S): at 20:45

## 2022-01-27 RX ADMIN — Medication 50 MILLIGRAM(S): at 12:35

## 2022-01-27 RX ADMIN — LITHIUM CARBONATE 450 MILLIGRAM(S): 300 TABLET, EXTENDED RELEASE ORAL at 12:51

## 2022-01-27 RX ADMIN — HALOPERIDOL DECANOATE 5 MILLIGRAM(S): 100 INJECTION INTRAMUSCULAR at 20:37

## 2022-01-27 RX ADMIN — QUETIAPINE FUMARATE 500 MILLIGRAM(S): 200 TABLET, FILM COATED ORAL at 20:37

## 2022-01-27 RX ADMIN — Medication 1 PATCH: at 11:20

## 2022-01-27 RX ADMIN — ALBUTEROL 2 PUFF(S): 90 AEROSOL, METERED ORAL at 12:34

## 2022-01-27 RX ADMIN — LITHIUM CARBONATE 450 MILLIGRAM(S): 300 TABLET, EXTENDED RELEASE ORAL at 20:43

## 2022-01-27 RX ADMIN — Medication 2 MILLIGRAM(S): at 12:34

## 2022-01-27 RX ADMIN — Medication 1 SPRAY(S): at 20:45

## 2022-01-27 RX ADMIN — Medication 1 PATCH: at 09:17

## 2022-01-27 RX ADMIN — Medication 1 MILLIGRAM(S): at 20:37

## 2022-01-27 NOTE — BH INPATIENT PSYCHIATRY PROGRESS NOTE - CURRENT MEDICATION
MEDICATIONS  (STANDING):  carbamide peroxide Otic Solution 4 Drop(s) Both Ears two times a day  clonazePAM  Tablet 1 milliGRAM(s) Oral at bedtime  influenza   Vaccine 0.5 milliLiter(s) IntraMuscular once  lithium CR (ESKALITH-CR) 450 milliGRAM(s) Oral once  lithium CR (ESKALITH-CR) 450 milliGRAM(s) Oral once  nicotine -   7 mG/24Hr(s) Patch 1 patch Transdermal daily  QUEtiapine 500 milliGRAM(s) Oral at bedtime    MEDICATIONS  (PRN):  ALBUTerol    90 MICROgram(s) HFA Inhaler 2 Puff(s) Inhalation every 6 hours PRN Shortness of Breath and/or Wheezing  artificial  tears Solution 1 Drop(s) Both EYES four times a day PRN dry eys  diphenhydrAMINE 50 milliGRAM(s) Oral every 6 hours PRN anxiety, agitation  diphenhydrAMINE Injectable 50 milliGRAM(s) IntraMuscular once PRN severe anxiety, agitation  haloperidol     Tablet 5 milliGRAM(s) Oral every 6 hours PRN agitation, anxiety  haloperidol    Injectable 5 milliGRAM(s) IntraMuscular Once PRN severe agitation, severe anxiety  LORazepam     Tablet 2 milliGRAM(s) Oral every 6 hours PRN Anxiety  LORazepam   Injectable 2 milliGRAM(s) IntraMuscular Once PRN severe anxiety  nicotine  Polacrilex Gum 4 milliGRAM(s) Oral every 4 hours PRN Craving  sodium chloride 0.65% Nasal 1 Spray(s) Both Nostrils two times a day PRN Congestion

## 2022-01-27 NOTE — BH INPATIENT PSYCHIATRY PROGRESS NOTE - NSBHFUPINTERVALHXFT_PSY_A_CORE
Chart reviewed including pertinent labs, imaging, ekg. case discussed with treatment team.  Over this interval: pt transferred from 2W (due to administrative reasons). Pt adjusting well to milieu. pleasant on approach. Patient believes she is here because of the need to refill her albuterol pump. Patient states there was some confusion about this but that her 48 hours of observation are up and she is requesting for a metro card. Patient insightful in some domains and states that she wants to be on Lithium and psyhoed provided regarding risks benefits, alternatives, a/e associated with Lithium. Pt amenable to trying Li. Pt with numerous somatic complaints and needing eye drops and script for contact lens, ear drops, complaints of weight gain

## 2022-01-27 NOTE — BH INPATIENT PSYCHIATRY PROGRESS NOTE - NSBHMSEGAIT_PSY_A_CORE
Referred by: Ester Mishra MD; Medical Diagnosis (from order):    Diagnosis Information      Diagnosis    V58.89, 840.4 (ICD-9-CM) - S46.011D (ICD-10-CM) - Traumatic complete tear of right rotator cuff, subsequent encounter    715.91 (ICD-9-CM) - M19.011 (ICD-10-CM) - Arthrosis of right acromioclavicular joint                Physical Therapy -  Daily Treatment Note    Visit: 22    SUBJECTIVE                                                                                                             2/15/21: patient reports he saw MD and says he is doing great, continue therapy two times a week.  Functional Change: Doing AAROM and gentle AROM below 90 degrees  Current functional limitations: Still unable to lift weight or bring arm over 90 degrees AROM    Pain / Symptoms:  Pain rating (out of 10): Current: 4     OBJECTIVE                                                                                                                     Range of Motion (ROM)   (degrees unless noted; active unless noted; norms in ( ); negative=lacking to 0, positive=beyond 0)   Shoulder:     - Flexion (180):        • Right: 155 pain  Passive: 175 pain      - Abduction (180):        • Right: 155 pain Passive: 175 pain      - Internal Rotation at 90° (70-90):         • Right: Passive: 65 pain    - Behind Back Internal Rotation:        • Right: hip    - External Rotation at 90° (90):         • Right: Passive: 80 pain    - Behind the Head External Rotation:        • Right: neck  Details / Comments: AAROM  Flex: 170  Abd: 150  IR at 90: 40  ER at 90: 75        Comments / Details:     TREATMENT                                                                                                                  Therapeutic Exercise:  Please use [employer]ocate's Rotator Cuff Post-op Protocol, DOS 11/18/20.     Exercises   Supine Shoulder Flexion with Dowel - 10 reps - 3 sets - 1x daily - 7x weekly (2#)  Standing Shoulder Extension ROM with Dowel - 10  reps - 1 sets - 1x daily - 7x weekly   Isometric Shoulder Abduction at Wall - 10 reps - 1-3 sets - 1x daily - 7x weekly   Standing Shoulder Internal Rotation Stretch with Towel - 10 reps - 10 hold - 1x daily - 7x weekly   Sidelying Shoulder Abduction Palm Forward - 10 reps - 1-2 sets - 1x daily - 7x weekly  (2#)  Sidelying Shoulder External Rotation - 10 reps - 3 sets - 1x daily - 7x weekly (1-2#)  Front raise 10 reps - 3 sets - 1x daily - 7x weekly (2#)  Lateral raise 10 reps - 3 sets - 1x daily - 7x weekly (1#)   Scaption 10 reps - 3 sets - 1x daily - 7x weekly (1#)  Standing Shoulder IR with Resistance - 10 reps - 1-3 sets - 1x daily - 7x weekly (red)  Standing Shoulder ER with Resistance  - 10 reps - 1-3 sets - 1x daily - 7x weekly (red)   Not done today Standing Single Arm Shoulder Flexion with Posterior Anchored Resistance - 10 reps - 1-3 sets - 1x daily - 7x weekly   Scapular Retraction with Resistance - 10 reps - 1-2 sets - 1x daily - 7x weekly   Shoulder extension with resistance - Neutral - 10 reps - 1-2 sets - 1x daily - 7x weekly   Standing Shoulder Adduction with Resistance and Elbow Extended - 10 reps - 1-3 sets - 1x daily - 7x weekly     Skilled input: verbal instruction/cues and tactile instruction/cues    Writer verbally educated and received verbal consent for hand placement, positioning of patient, and techniques to be performed today from patient for clothing adjustments for techniques, therapist position for techniques and hand placement and palpation for techniques as described above and how they are pertinent to the patient's plan of care.    Home Exercise Program: Access Code: [email or web address]      ASSESSMENT                                                                                                             Patient is now 13 weeks, improved AROM and able to progress resistance and reps of exercises. Progressed to scaption with no increased pain, overall fatigue and muscle soreness  bilaterally. Updated HEP.  Pain/symptoms after session: 2    Patient Education:   Results of above outlined education: Verbalizes understanding, Demonstrates understanding and Needs reinforcement      PLAN                                                                                                                           Patient involved in and agreed to plan of care and goals.  Suggestions for next session as indicated: Progress per plan of care          Procedures and total treatment time documented Time Entry flowsheet.   Normal gait / station

## 2022-01-27 NOTE — BH INPATIENT PSYCHIATRY PROGRESS NOTE - NSBHASSESSSUMMFT_PSY_ALL_CORE
21/F with hx of bipolar disorder with PF, multiple past psych admissions including most recent [facility] discharge x 12 days ago, no reported hx of SA, and hx of alcohol/ cannabis abuse.  Today, presented to the ED BIB parents due to worsening agitated behavior in the context of a manic episode.  Patient is disorganized in TP ( tangential, illogical/ impaired reasoning), is severely affectively dysregulated and remains unpredictable, harboring SI, delusional, with poor insight and impaired judgement.  Collateral information was obtained from Pt's family who raised concerns that the Pt has significantly decompensated - they report of Pt being delusional, manifesting bizarre behaviors as well as engaged in reckless/ impulsive behaviors.  Last night, Pt allegedly took father's medications stating that "she wanted to commit suicide".       On interview this AM, patient is pleasant, somewhat euphoric, agreeable to trial of Lithium. Speech was not overtly pressured though it was increased rate, she slept through the night per patient and corroborated by sleep log. patient with delusional content including her presentation to the hospital and with somatic preoccupation. she is discharge focused.     no CO indicated   c/w Seroquel 500mg qhs   Start Lithium 450mg BID   Clonazepam 1mg qhs   haldol 5 / lorazepam 2 PRN for agitation   albuterol PRN for asthma   coordinate with s/w

## 2022-01-28 RX ADMIN — CARBAMIDE PEROXIDE 4 DROP(S): 81.86 SOLUTION/ DROPS AURICULAR (OTIC) at 08:59

## 2022-01-28 RX ADMIN — Medication 1 PATCH: at 09:15

## 2022-01-28 RX ADMIN — QUETIAPINE FUMARATE 500 MILLIGRAM(S): 200 TABLET, FILM COATED ORAL at 21:14

## 2022-01-28 RX ADMIN — LITHIUM CARBONATE 450 MILLIGRAM(S): 300 TABLET, EXTENDED RELEASE ORAL at 08:58

## 2022-01-28 RX ADMIN — Medication 1 MILLIGRAM(S): at 21:14

## 2022-01-28 RX ADMIN — Medication 1 PATCH: at 08:58

## 2022-01-28 RX ADMIN — Medication 4 MILLIGRAM(S): at 11:34

## 2022-01-28 RX ADMIN — Medication 1 SPRAY(S): at 08:59

## 2022-01-28 RX ADMIN — LITHIUM CARBONATE 450 MILLIGRAM(S): 300 TABLET, EXTENDED RELEASE ORAL at 21:14

## 2022-01-28 RX ADMIN — Medication 1 DROP(S): at 08:59

## 2022-01-28 RX ADMIN — CARBAMIDE PEROXIDE 4 DROP(S): 81.86 SOLUTION/ DROPS AURICULAR (OTIC) at 21:16

## 2022-01-28 RX ADMIN — ALBUTEROL 2 PUFF(S): 90 AEROSOL, METERED ORAL at 08:59

## 2022-01-28 NOTE — BH INPATIENT PSYCHIATRY PROGRESS NOTE - MSE UNSTRUCTURED FT
Age appearing overweight female in casual clothing. behaviorally cooperative, somewhat disinhibited, hypersexual with another peer last night, speech increased rate, normal tone, hygiene and grooming fair, eye contact appropriate, mood "fine," Affect elated, mood incongruent, thought process with perseveration and circumstantiality, illogical. thought content with preoccupations related to discharge, denies SI and HI, denies AVH, insight limited, judgement fair and improving regarding med adherence, impulse control fair. psychomotor normal, no abnl movements / tremors

## 2022-01-28 NOTE — BH INPATIENT PSYCHIATRY PROGRESS NOTE - CURRENT MEDICATION
MEDICATIONS  (STANDING):  carbamide peroxide Otic Solution 4 Drop(s) Both Ears two times a day  clonazePAM  Tablet 1 milliGRAM(s) Oral at bedtime  influenza   Vaccine 0.5 milliLiter(s) IntraMuscular once  lithium CR (ESKALITH-CR) 450 milliGRAM(s) Oral two times a day  nicotine -   7 mG/24Hr(s) Patch 1 patch Transdermal daily  QUEtiapine 500 milliGRAM(s) Oral at bedtime    MEDICATIONS  (PRN):  ALBUTerol    90 MICROgram(s) HFA Inhaler 2 Puff(s) Inhalation every 6 hours PRN Shortness of Breath and/or Wheezing  artificial  tears Solution 1 Drop(s) Both EYES four times a day PRN dry eys  diphenhydrAMINE 50 milliGRAM(s) Oral every 6 hours PRN anxiety, agitation  diphenhydrAMINE Injectable 50 milliGRAM(s) IntraMuscular once PRN severe anxiety, agitation  guaiFENesin Oral Liquid (Sugar-Free) 200 milliGRAM(s) Oral every 6 hours PRN cough  haloperidol     Tablet 5 milliGRAM(s) Oral every 6 hours PRN agitation, anxiety  haloperidol    Injectable 5 milliGRAM(s) IntraMuscular Once PRN severe agitation, severe anxiety  LORazepam     Tablet 2 milliGRAM(s) Oral every 6 hours PRN Anxiety  LORazepam   Injectable 2 milliGRAM(s) IntraMuscular Once PRN severe anxiety  nicotine  Polacrilex Gum 4 milliGRAM(s) Oral every 4 hours PRN Craving  sodium chloride 0.65% Nasal 1 Spray(s) Both Nostrils two times a day PRN Congestion
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
## 2022-01-29 PROCEDURE — 99231 SBSQ HOSP IP/OBS SF/LOW 25: CPT

## 2022-01-29 RX ORDER — BACITRACIN ZINC 500 UNIT/G
1 OINTMENT IN PACKET (EA) TOPICAL EVERY 8 HOURS
Refills: 0 | Status: DISCONTINUED | OUTPATIENT
Start: 2022-01-29 | End: 2022-02-07

## 2022-01-29 RX ADMIN — Medication 1 PATCH: at 09:50

## 2022-01-29 RX ADMIN — Medication 2 MILLIGRAM(S): at 10:04

## 2022-01-29 RX ADMIN — LITHIUM CARBONATE 450 MILLIGRAM(S): 300 TABLET, EXTENDED RELEASE ORAL at 20:40

## 2022-01-29 RX ADMIN — CARBAMIDE PEROXIDE 4 DROP(S): 81.86 SOLUTION/ DROPS AURICULAR (OTIC) at 09:01

## 2022-01-29 RX ADMIN — CARBAMIDE PEROXIDE 4 DROP(S): 81.86 SOLUTION/ DROPS AURICULAR (OTIC) at 20:44

## 2022-01-29 RX ADMIN — QUETIAPINE FUMARATE 500 MILLIGRAM(S): 200 TABLET, FILM COATED ORAL at 20:41

## 2022-01-29 RX ADMIN — Medication 50 MILLIGRAM(S): at 11:10

## 2022-01-29 RX ADMIN — Medication 2 MILLIGRAM(S): at 23:26

## 2022-01-29 RX ADMIN — Medication 1 MILLIGRAM(S): at 20:41

## 2022-01-29 RX ADMIN — LITHIUM CARBONATE 450 MILLIGRAM(S): 300 TABLET, EXTENDED RELEASE ORAL at 08:45

## 2022-01-29 NOTE — BH INPATIENT PSYCHIATRY PROGRESS NOTE - CURRENT MEDICATION
MEDICATIONS  (STANDING):  carbamide peroxide Otic Solution 4 Drop(s) Both Ears two times a day  clonazePAM  Tablet 1 milliGRAM(s) Oral at bedtime  influenza   Vaccine 0.5 milliLiter(s) IntraMuscular once  lithium CR (ESKALITH-CR) 450 milliGRAM(s) Oral two times a day  nicotine -   7 mG/24Hr(s) Patch 1 patch Transdermal daily  QUEtiapine 500 milliGRAM(s) Oral at bedtime    MEDICATIONS  (PRN):  ALBUTerol    90 MICROgram(s) HFA Inhaler 2 Puff(s) Inhalation every 6 hours PRN Shortness of Breath and/or Wheezing  artificial  tears Solution 1 Drop(s) Both EYES four times a day PRN dry eys  BACItracin   Ointment 1 Application(s) Topical every 8 hours PRN wound care  diphenhydrAMINE 50 milliGRAM(s) Oral every 6 hours PRN anxiety, agitation  diphenhydrAMINE Injectable 50 milliGRAM(s) IntraMuscular once PRN severe anxiety, agitation  guaiFENesin Oral Liquid (Sugar-Free) 200 milliGRAM(s) Oral every 6 hours PRN cough  haloperidol     Tablet 5 milliGRAM(s) Oral every 6 hours PRN agitation, anxiety  haloperidol    Injectable 5 milliGRAM(s) IntraMuscular Once PRN severe agitation, severe anxiety  LORazepam     Tablet 2 milliGRAM(s) Oral every 6 hours PRN Anxiety  LORazepam   Injectable 2 milliGRAM(s) IntraMuscular Once PRN severe anxiety  nicotine  Polacrilex Gum 4 milliGRAM(s) Oral every 4 hours PRN Craving  sodium chloride 0.65% Nasal 1 Spray(s) Both Nostrils two times a day PRN Congestion

## 2022-01-29 NOTE — BH INPATIENT PSYCHIATRY PROGRESS NOTE - NSBHASSESSSUMMFT_PSY_ALL_CORE
21/F with hx of bipolar disorder with PF, multiple past psych admissions including most recent [facility] discharge x 12 days ago, no reported hx of SA, and hx of alcohol/ cannabis abuse.  Today, presented to the ED BIB parents due to worsening agitated behavior in the context of a manic episode.  Patient is disorganized in TP ( tangential, illogical/ impaired reasoning), is severely affectively dysregulated and remains unpredictable, harboring SI, delusional, with poor insight and impaired judgement.  Collateral information was obtained from Pt's family who raised concerns that the Pt has significantly decompensated - they report of Pt being delusional, manifesting bizarre behaviors as well as engaged in reckless/ impulsive behaviors.  Last night, Pt allegedly took father's medications stating that "she wanted to commit suicide".       Frustrated and discharge focused.  Continue plan as below.    no CO indicated   c/w Seroquel 500mg qhs   Start Lithium 450mg BID   Clonazepam 1mg qhs   haldol 5 / lorazepam 2 PRN for agitation   albuterol PRN for asthma   coordinate with s/w

## 2022-01-29 NOTE — BH INPATIENT PSYCHIATRY PROGRESS NOTE - MSE UNSTRUCTURED FT
Dressed appropriately.  Good hygiene and grooming.  Calm and cooperative.  No psychomotor slowing or activation noted.  No abnormal movements noted.  Good eye contact.  Speech regular rate, normal prosody.  Mood is "ok," affect frustrated.  Linear TP, fair associations.  TC: Denies SIIP or HIIP.  Denies AVH.  Insight and judgment limited, attention fair, language fluent, gait intact.

## 2022-01-30 PROCEDURE — 99231 SBSQ HOSP IP/OBS SF/LOW 25: CPT

## 2022-01-30 RX ORDER — LORATADINE 10 MG/1
10 TABLET ORAL ONCE
Refills: 0 | Status: COMPLETED | OUTPATIENT
Start: 2022-01-30 | End: 2022-01-30

## 2022-01-30 RX ADMIN — ALBUTEROL 2 PUFF(S): 90 AEROSOL, METERED ORAL at 20:55

## 2022-01-30 RX ADMIN — Medication 1 PATCH: at 19:46

## 2022-01-30 RX ADMIN — QUETIAPINE FUMARATE 500 MILLIGRAM(S): 200 TABLET, FILM COATED ORAL at 20:26

## 2022-01-30 RX ADMIN — Medication 1 SPRAY(S): at 20:55

## 2022-01-30 RX ADMIN — Medication 1 PATCH: at 09:17

## 2022-01-30 RX ADMIN — Medication 1 MILLIGRAM(S): at 20:26

## 2022-01-30 RX ADMIN — Medication 4 MILLIGRAM(S): at 13:10

## 2022-01-30 RX ADMIN — LORATADINE 10 MILLIGRAM(S): 10 TABLET ORAL at 14:20

## 2022-01-30 RX ADMIN — CARBAMIDE PEROXIDE 4 DROP(S): 81.86 SOLUTION/ DROPS AURICULAR (OTIC) at 09:52

## 2022-01-30 RX ADMIN — Medication 1 DROP(S): at 20:56

## 2022-01-30 RX ADMIN — LITHIUM CARBONATE 450 MILLIGRAM(S): 300 TABLET, EXTENDED RELEASE ORAL at 09:17

## 2022-01-30 RX ADMIN — LITHIUM CARBONATE 450 MILLIGRAM(S): 300 TABLET, EXTENDED RELEASE ORAL at 20:26

## 2022-01-30 NOTE — BH INPATIENT PSYCHIATRY PROGRESS NOTE - NSBHASSESSSUMMFT_PSY_ALL_CORE
21/F with hx of bipolar disorder with PF, multiple past psych admissions including most recent [facility] discharge x 12 days ago, no reported hx of SA, and hx of alcohol/ cannabis abuse.  Today, presented to the ED BIB parents due to worsening agitated behavior in the context of a manic episode.    behaviorally grossly in control, adherent to medications, remains discharge focused. will obtain Li level tomorrow. May consider tapering of Seroquel in setting of now being on Li though will cautiously decrease as Li is unlikely to be at steady state    no CO indicated   c/w Seroquel 500mg qhs   c/w Lithium 450mg BID   Clonazepam 1mg qhs   haldol 5 / lorazepam 2 PRN for agitation   albuterol PRN for asthma   coordinate with s/w

## 2022-01-30 NOTE — BH INPATIENT PSYCHIATRY PROGRESS NOTE - NSBHFUPINTERVALHXFT_PSY_A_CORE
chart reviewed. case discussed with nursing staff. Over this interval: pt adherent to meds. remains discharge focused. wants her Seroquel dose to be reduced. denies SI and HI. Denies AVH.

## 2022-01-30 NOTE — BH INPATIENT PSYCHIATRY PROGRESS NOTE - MSE UNSTRUCTURED FT
Dressed appropriately.  Good hygiene and grooming.  Calm and cooperative.  No psychomotor slowing or activation noted.  No abnormal movements noted.  Good eye contact.  Speech regular rate, normal prosody.  Mood is "ok," affect constricted.  Linear TP, fair associations.  TC: Denies SIIP or HIIP.  Denies AVH.  Insight fair-to-poor, and judgment fair in relation to med adherence, attention fair, language fluent, gait intact.

## 2022-01-30 NOTE — BH INPATIENT PSYCHIATRY PROGRESS NOTE - CURRENT MEDICATION
MEDICATIONS  (STANDING):  clonazePAM  Tablet 1 milliGRAM(s) Oral at bedtime  influenza   Vaccine 0.5 milliLiter(s) IntraMuscular once  lithium CR (ESKALITH-CR) 450 milliGRAM(s) Oral two times a day  loratadine 10 milliGRAM(s) Oral once  nicotine -   7 mG/24Hr(s) Patch 1 patch Transdermal daily  QUEtiapine 500 milliGRAM(s) Oral at bedtime    MEDICATIONS  (PRN):  ALBUTerol    90 MICROgram(s) HFA Inhaler 2 Puff(s) Inhalation every 6 hours PRN Shortness of Breath and/or Wheezing  artificial  tears Solution 1 Drop(s) Both EYES four times a day PRN dry eys  BACItracin   Ointment 1 Application(s) Topical every 8 hours PRN wound care  diphenhydrAMINE 50 milliGRAM(s) Oral every 6 hours PRN anxiety, agitation  diphenhydrAMINE Injectable 50 milliGRAM(s) IntraMuscular once PRN severe anxiety, agitation  guaiFENesin Oral Liquid (Sugar-Free) 200 milliGRAM(s) Oral every 6 hours PRN cough  haloperidol     Tablet 5 milliGRAM(s) Oral every 6 hours PRN agitation, anxiety  haloperidol    Injectable 5 milliGRAM(s) IntraMuscular Once PRN severe agitation, severe anxiety  LORazepam     Tablet 2 milliGRAM(s) Oral every 6 hours PRN Anxiety  LORazepam   Injectable 2 milliGRAM(s) IntraMuscular Once PRN severe anxiety  nicotine  Polacrilex Gum 4 milliGRAM(s) Oral every 4 hours PRN Craving  sodium chloride 0.65% Nasal 1 Spray(s) Both Nostrils two times a day PRN Congestion

## 2022-01-31 LAB
ALBUMIN SERPL ELPH-MCNC: 3.4 G/DL — SIGNIFICANT CHANGE UP (ref 3.3–5)
ALP SERPL-CCNC: 69 U/L — SIGNIFICANT CHANGE UP (ref 40–120)
ALT FLD-CCNC: 27 U/L — SIGNIFICANT CHANGE UP (ref 4–33)
ANION GAP SERPL CALC-SCNC: 7 MMOL/L — SIGNIFICANT CHANGE UP (ref 7–14)
APPEARANCE UR: ABNORMAL
AST SERPL-CCNC: 31 U/L — SIGNIFICANT CHANGE UP (ref 4–32)
BACTERIA # UR AUTO: ABNORMAL
BILIRUB SERPL-MCNC: 0.3 MG/DL — SIGNIFICANT CHANGE UP (ref 0.2–1.2)
BILIRUB UR-MCNC: NEGATIVE — SIGNIFICANT CHANGE UP
BUN SERPL-MCNC: 9 MG/DL — SIGNIFICANT CHANGE UP (ref 7–23)
CALCIUM SERPL-MCNC: 9.2 MG/DL — SIGNIFICANT CHANGE UP (ref 8.4–10.5)
CHLORIDE SERPL-SCNC: 103 MMOL/L — SIGNIFICANT CHANGE UP (ref 98–107)
CO2 SERPL-SCNC: 24 MMOL/L — SIGNIFICANT CHANGE UP (ref 22–31)
COLOR SPEC: YELLOW — SIGNIFICANT CHANGE UP
CREAT SERPL-MCNC: 0.71 MG/DL — SIGNIFICANT CHANGE UP (ref 0.5–1.3)
DIFF PNL FLD: NEGATIVE — SIGNIFICANT CHANGE UP
EPI CELLS # UR: 6 /HPF — HIGH (ref 0–5)
GLUCOSE SERPL-MCNC: 72 MG/DL — SIGNIFICANT CHANGE UP (ref 70–99)
GLUCOSE UR QL: NEGATIVE — SIGNIFICANT CHANGE UP
HYALINE CASTS # UR AUTO: 0 /LPF — SIGNIFICANT CHANGE UP (ref 0–7)
KETONES UR-MCNC: NEGATIVE — SIGNIFICANT CHANGE UP
LEUKOCYTE ESTERASE UR-ACNC: ABNORMAL
LITHIUM SERPL-MCNC: 1.1 MMOL/L — SIGNIFICANT CHANGE UP (ref 0.6–1.2)
NITRITE UR-MCNC: NEGATIVE — SIGNIFICANT CHANGE UP
PH UR: 7.5 — SIGNIFICANT CHANGE UP (ref 5–8)
POTASSIUM SERPL-MCNC: 4.4 MMOL/L — SIGNIFICANT CHANGE UP (ref 3.5–5.3)
POTASSIUM SERPL-SCNC: 4.4 MMOL/L — SIGNIFICANT CHANGE UP (ref 3.5–5.3)
PROT SERPL-MCNC: 7.9 G/DL — SIGNIFICANT CHANGE UP (ref 6–8.3)
PROT UR-MCNC: ABNORMAL
RBC CASTS # UR COMP ASSIST: 3 /HPF — SIGNIFICANT CHANGE UP (ref 0–4)
SODIUM SERPL-SCNC: 134 MMOL/L — LOW (ref 135–145)
SP GR SPEC: 1.02 — SIGNIFICANT CHANGE UP (ref 1–1.05)
UROBILINOGEN FLD QL: SIGNIFICANT CHANGE UP
WBC UR QL: 29 /HPF — HIGH (ref 0–5)

## 2022-01-31 PROCEDURE — 99231 SBSQ HOSP IP/OBS SF/LOW 25: CPT

## 2022-01-31 RX ORDER — NITROFURANTOIN MACROCRYSTAL 50 MG
100 CAPSULE ORAL
Refills: 0 | Status: DISCONTINUED | OUTPATIENT
Start: 2022-01-31 | End: 2022-02-02

## 2022-01-31 RX ORDER — LITHIUM CARBONATE 300 MG/1
300 TABLET, EXTENDED RELEASE ORAL DAILY
Refills: 0 | Status: DISCONTINUED | OUTPATIENT
Start: 2022-02-01 | End: 2022-02-07

## 2022-01-31 RX ORDER — LITHIUM CARBONATE 300 MG/1
450 TABLET, EXTENDED RELEASE ORAL AT BEDTIME
Refills: 0 | Status: DISCONTINUED | OUTPATIENT
Start: 2022-01-31 | End: 2022-02-07

## 2022-01-31 RX ORDER — CLONAZEPAM 1 MG
1 TABLET ORAL AT BEDTIME
Refills: 0 | Status: DISCONTINUED | OUTPATIENT
Start: 2022-01-31 | End: 2022-02-04

## 2022-01-31 RX ADMIN — LITHIUM CARBONATE 450 MILLIGRAM(S): 300 TABLET, EXTENDED RELEASE ORAL at 21:44

## 2022-01-31 RX ADMIN — LITHIUM CARBONATE 450 MILLIGRAM(S): 300 TABLET, EXTENDED RELEASE ORAL at 08:50

## 2022-01-31 RX ADMIN — Medication 1 DROP(S): at 18:01

## 2022-01-31 RX ADMIN — Medication 1 PATCH: at 08:50

## 2022-01-31 RX ADMIN — HALOPERIDOL DECANOATE 5 MILLIGRAM(S): 100 INJECTION INTRAMUSCULAR at 10:37

## 2022-01-31 RX ADMIN — Medication 50 MILLIGRAM(S): at 14:51

## 2022-01-31 RX ADMIN — Medication 4 MILLIGRAM(S): at 18:01

## 2022-01-31 RX ADMIN — QUETIAPINE FUMARATE 500 MILLIGRAM(S): 200 TABLET, FILM COATED ORAL at 21:44

## 2022-01-31 RX ADMIN — Medication 1 SPRAY(S): at 18:01

## 2022-01-31 RX ADMIN — Medication 1 MILLIGRAM(S): at 21:44

## 2022-01-31 RX ADMIN — ALBUTEROL 2 PUFF(S): 90 AEROSOL, METERED ORAL at 18:01

## 2022-01-31 RX ADMIN — Medication 1 APPLICATION(S): at 22:58

## 2022-01-31 RX ADMIN — Medication 1 DROP(S): at 22:57

## 2022-01-31 RX ADMIN — Medication 100 MILLIGRAM(S): at 21:43

## 2022-01-31 RX ADMIN — Medication 1 TABLET(S): at 10:37

## 2022-01-31 RX ADMIN — Medication 1 SPRAY(S): at 10:36

## 2022-01-31 NOTE — BH INPATIENT PSYCHIATRY PROGRESS NOTE - MSE UNSTRUCTURED FT
Dressed appropriately.  Good hygiene and grooming.  Calm and cooperative.  No psychomotor slowing or activation noted.  No abnormal movements noted.  Good eye contact.  Speech regular rate, normal prosody.  Mood is "ok," somewhat anxious appearin gaffect.  Linear TP, fair associations.  TC: Denies SIIP or HIIP.  Denies AVH.  Insight fair, and judgment appropriate in relation to med adherence, attention fair, language fluent, gait intact.

## 2022-01-31 NOTE — BH INPATIENT PSYCHIATRY PROGRESS NOTE - NSBHASSESSSUMMFT_PSY_ALL_CORE
21/F with hx of bipolar disorder with PF, multiple past psych admissions including most recent [facility] discharge x 12 days ago, no reported hx of SA, and hx of alcohol/ cannabis abuse.  Today, presented to the ED BIB parents due to worsening agitated behavior in the context of a manic episode.    in behavioral control, adherent to meds, appropriate sleep, speech less pressured, wants Seroquel reduced and is discharge focused. Li level obtained today; will follow up    no CO indicated   c/w Seroquel 500mg qhs   c/w Lithium 450mg BID   Clonazepam 1mg qhs   haldol 5 / lorazepam 2 PRN for agitation   albuterol PRN for asthma   coordinate with s/w    21/F with hx of bipolar disorder with PF, multiple past psych admissions including most recent [facility] discharge x 12 days ago, no reported hx of SA, and hx of alcohol/ cannabis abuse.  Today, presented to the ED BIB parents due to worsening agitated behavior in the context of a manic episode.    in behavioral control, adherent to meds, appropriate sleep, speech less pressured, wants Seroquel reduced and is discharge focused. Li level obtained today; will follow up--addendum -- Li level of 1.1; will decrease to 450mg qhs + 300mg daily     no CO indicated   c/w Seroquel 500mg qhs   c/w Lithium 300mg daily + 450mg qhs  Clonazepam 1mg qhs   haldol 5 / lorazepam 2 PRN for agitation   albuterol PRN for asthma   coordinate with s/w

## 2022-01-31 NOTE — BH INPATIENT PSYCHIATRY PROGRESS NOTE - NSBHFUPINTERVALHXFT_PSY_A_CORE
Chart reviewed including pertinent labs, imaging, ekg. case discussed with treatment team.  Over this interval: remains discharge focused, adherent to meds, no overt behavioral issues, reports feeling her PH is off and requests for UA. slept through the night

## 2022-01-31 NOTE — BH INPATIENT PSYCHIATRY PROGRESS NOTE - CURRENT MEDICATION
MEDICATIONS  (STANDING):  clonazePAM  Tablet 1 milliGRAM(s) Oral at bedtime  influenza   Vaccine 0.5 milliLiter(s) IntraMuscular once  lithium CR (ESKALITH-CR) 450 milliGRAM(s) Oral two times a day  multivitamin 1 Tablet(s) Oral daily  nicotine -   7 mG/24Hr(s) Patch 1 patch Transdermal daily  QUEtiapine 500 milliGRAM(s) Oral at bedtime    MEDICATIONS  (PRN):  ALBUTerol    90 MICROgram(s) HFA Inhaler 2 Puff(s) Inhalation every 6 hours PRN Shortness of Breath and/or Wheezing  artificial  tears Solution 1 Drop(s) Both EYES four times a day PRN dry eys  BACItracin   Ointment 1 Application(s) Topical every 8 hours PRN wound care  diphenhydrAMINE 50 milliGRAM(s) Oral every 6 hours PRN anxiety, agitation  diphenhydrAMINE Injectable 50 milliGRAM(s) IntraMuscular once PRN severe anxiety, agitation  guaiFENesin Oral Liquid (Sugar-Free) 200 milliGRAM(s) Oral every 6 hours PRN cough  haloperidol     Tablet 5 milliGRAM(s) Oral every 6 hours PRN agitation, anxiety  haloperidol    Injectable 5 milliGRAM(s) IntraMuscular Once PRN severe agitation, severe anxiety  LORazepam     Tablet 2 milliGRAM(s) Oral every 6 hours PRN Anxiety  LORazepam   Injectable 2 milliGRAM(s) IntraMuscular Once PRN severe anxiety  nicotine  Polacrilex Gum 4 milliGRAM(s) Oral every 4 hours PRN Craving  sodium chloride 0.65% Nasal 1 Spray(s) Both Nostrils two times a day PRN Congestion   MEDICATIONS  (STANDING):  clonazePAM  Tablet 1 milliGRAM(s) Oral at bedtime  influenza   Vaccine 0.5 milliLiter(s) IntraMuscular once  lithium 450 milliGRAM(s) Oral at bedtime  multivitamin 1 Tablet(s) Oral daily  nicotine -   7 mG/24Hr(s) Patch 1 patch Transdermal daily  nitrofurantoin monohydrate/macrocrystals (MACROBID) 100 milliGRAM(s) Oral two times a day  QUEtiapine 500 milliGRAM(s) Oral at bedtime    MEDICATIONS  (PRN):  ALBUTerol    90 MICROgram(s) HFA Inhaler 2 Puff(s) Inhalation every 6 hours PRN Shortness of Breath and/or Wheezing  artificial  tears Solution 1 Drop(s) Both EYES four times a day PRN dry eys  BACItracin   Ointment 1 Application(s) Topical every 8 hours PRN wound care  diphenhydrAMINE 50 milliGRAM(s) Oral every 6 hours PRN anxiety, agitation  diphenhydrAMINE Injectable 50 milliGRAM(s) IntraMuscular once PRN severe anxiety, agitation  guaiFENesin Oral Liquid (Sugar-Free) 200 milliGRAM(s) Oral every 6 hours PRN cough  haloperidol     Tablet 5 milliGRAM(s) Oral every 6 hours PRN agitation, anxiety  haloperidol    Injectable 5 milliGRAM(s) IntraMuscular Once PRN severe agitation, severe anxiety  LORazepam     Tablet 2 milliGRAM(s) Oral every 6 hours PRN Anxiety  LORazepam   Injectable 2 milliGRAM(s) IntraMuscular Once PRN severe anxiety  nicotine  Polacrilex Gum 4 milliGRAM(s) Oral every 4 hours PRN Craving  sodium chloride 0.65% Nasal 1 Spray(s) Both Nostrils two times a day PRN Congestion

## 2022-02-01 LAB
CULTURE RESULTS: SIGNIFICANT CHANGE UP
SPECIMEN SOURCE: SIGNIFICANT CHANGE UP

## 2022-02-01 PROCEDURE — 99231 SBSQ HOSP IP/OBS SF/LOW 25: CPT

## 2022-02-01 RX ORDER — ACETAMINOPHEN 500 MG
650 TABLET ORAL EVERY 6 HOURS
Refills: 0 | Status: DISCONTINUED | OUTPATIENT
Start: 2022-02-01 | End: 2022-02-07

## 2022-02-01 RX ADMIN — Medication 1 MILLIGRAM(S): at 21:56

## 2022-02-01 RX ADMIN — Medication 650 MILLIGRAM(S): at 13:27

## 2022-02-01 RX ADMIN — LITHIUM CARBONATE 300 MILLIGRAM(S): 300 TABLET, EXTENDED RELEASE ORAL at 08:31

## 2022-02-01 RX ADMIN — QUETIAPINE FUMARATE 500 MILLIGRAM(S): 200 TABLET, FILM COATED ORAL at 21:57

## 2022-02-01 RX ADMIN — Medication 1 PATCH: at 08:31

## 2022-02-01 RX ADMIN — Medication 100 MILLIGRAM(S): at 21:57

## 2022-02-01 RX ADMIN — HALOPERIDOL DECANOATE 5 MILLIGRAM(S): 100 INJECTION INTRAMUSCULAR at 10:18

## 2022-02-01 RX ADMIN — Medication 1 TABLET(S): at 08:31

## 2022-02-01 RX ADMIN — Medication 50 MILLIGRAM(S): at 10:18

## 2022-02-01 RX ADMIN — Medication 100 MILLIGRAM(S): at 08:31

## 2022-02-01 RX ADMIN — LITHIUM CARBONATE 450 MILLIGRAM(S): 300 TABLET, EXTENDED RELEASE ORAL at 21:56

## 2022-02-01 NOTE — BH INPATIENT PSYCHIATRY PROGRESS NOTE - NSBHASSESSSUMMFT_PSY_ALL_CORE
21/F with hx of bipolar disorder with PF, multiple past psych admissions including most recent [facility] discharge x 12 days ago, no reported hx of SA, and hx of alcohol/ cannabis abuse.  Today, presented to the ED BIB parents due to worsening agitated behavior in the context of a manic episode.    more irate / dysphoric and labile this AM. Li reduced due to level of 1.1 though it's unclear whether this was a true trough. will continue regimen below. Pt with active manic symptoms impairing her ability to safely care for herself and requiring further hospitalization for symptom stabilization     no CO indicated   c/w Seroquel 500mg qhs   c/w Lithium 300mg daily + 450mg qhs  Clonazepam 1mg qhs   haldol 5 / lorazepam 2 PRN for agitation   albuterol PRN for asthma   coordinate with s/w

## 2022-02-01 NOTE — BH INPATIENT PSYCHIATRY PROGRESS NOTE - MSE UNSTRUCTURED FT
Dressed appropriately.  Good hygiene and grooming.  behaviorally more irate. speech somewhat pressured, loud. strong eye contact. psychomotor agitated. No abnormal movements noted. Mood is "upset," dysphoric labile affect.  Linear TP, fair associations.  TC: Denies SIIP or HIIP.  Denies AVH.  Insight limited, and judgment appropriate in relation to med adherence, impulse control fair-to-poor. attention fair, language fluent, gait intact.

## 2022-02-01 NOTE — BH INPATIENT PSYCHIATRY PROGRESS NOTE - NSBHFUPINTERVALHXFT_PSY_A_CORE
Chart reviewed including pertinent labs, imaging, ekg. case discussed with treatment team.  Over this interval: pt with UA suggestive of UTI - case discussed with hospitalist and Macrobid added. Pt adherent to meds. Li decreased due to level of 1.1 though unclear if this is true trough. Pt slept through the night. Remains discharge focused, appears more irate this AM, begins yelling at me making demands that she has to leave to attend school. Family in contact with team and family advocating for further hospitalization to treat manic symptoms. Patient later seen yelling on the telephone and several minutes later, seen tearful on the phone

## 2022-02-01 NOTE — BH INPATIENT PSYCHIATRY PROGRESS NOTE - CURRENT MEDICATION
MEDICATIONS  (STANDING):  clonazePAM  Tablet 1 milliGRAM(s) Oral at bedtime  influenza   Vaccine 0.5 milliLiter(s) IntraMuscular once  lithium 450 milliGRAM(s) Oral at bedtime  lithium 300 milliGRAM(s) Oral daily  multivitamin 1 Tablet(s) Oral daily  nicotine -   7 mG/24Hr(s) Patch 1 patch Transdermal daily  nitrofurantoin monohydrate/macrocrystals (MACROBID) 100 milliGRAM(s) Oral two times a day  QUEtiapine 500 milliGRAM(s) Oral at bedtime    MEDICATIONS  (PRN):  ALBUTerol    90 MICROgram(s) HFA Inhaler 2 Puff(s) Inhalation every 6 hours PRN Shortness of Breath and/or Wheezing  artificial  tears Solution 1 Drop(s) Both EYES four times a day PRN dry eys  BACItracin   Ointment 1 Application(s) Topical every 8 hours PRN wound care  diphenhydrAMINE 50 milliGRAM(s) Oral every 6 hours PRN anxiety, agitation  diphenhydrAMINE Injectable 50 milliGRAM(s) IntraMuscular once PRN severe anxiety, agitation  guaiFENesin Oral Liquid (Sugar-Free) 200 milliGRAM(s) Oral every 6 hours PRN cough  haloperidol     Tablet 5 milliGRAM(s) Oral every 6 hours PRN agitation, anxiety  haloperidol    Injectable 5 milliGRAM(s) IntraMuscular Once PRN severe agitation, severe anxiety  LORazepam     Tablet 2 milliGRAM(s) Oral every 6 hours PRN Anxiety  LORazepam   Injectable 2 milliGRAM(s) IntraMuscular Once PRN severe anxiety  nicotine  Polacrilex Gum 4 milliGRAM(s) Oral every 4 hours PRN Craving  sodium chloride 0.65% Nasal 1 Spray(s) Both Nostrils two times a day PRN Congestion

## 2022-02-02 PROCEDURE — 99231 SBSQ HOSP IP/OBS SF/LOW 25: CPT

## 2022-02-02 RX ADMIN — Medication 1 TABLET(S): at 09:38

## 2022-02-02 RX ADMIN — Medication 1 PATCH: at 09:38

## 2022-02-02 RX ADMIN — ALBUTEROL 2 PUFF(S): 90 AEROSOL, METERED ORAL at 15:13

## 2022-02-02 RX ADMIN — Medication 1 DROP(S): at 15:12

## 2022-02-02 RX ADMIN — Medication 1 MILLIGRAM(S): at 22:44

## 2022-02-02 RX ADMIN — Medication 100 MILLIGRAM(S): at 09:37

## 2022-02-02 RX ADMIN — Medication 4 MILLIGRAM(S): at 15:13

## 2022-02-02 RX ADMIN — Medication 650 MILLIGRAM(S): at 15:10

## 2022-02-02 RX ADMIN — LITHIUM CARBONATE 300 MILLIGRAM(S): 300 TABLET, EXTENDED RELEASE ORAL at 09:38

## 2022-02-02 RX ADMIN — QUETIAPINE FUMARATE 500 MILLIGRAM(S): 200 TABLET, FILM COATED ORAL at 22:43

## 2022-02-02 RX ADMIN — LITHIUM CARBONATE 450 MILLIGRAM(S): 300 TABLET, EXTENDED RELEASE ORAL at 22:44

## 2022-02-02 RX ADMIN — Medication 1 SPRAY(S): at 15:13

## 2022-02-02 NOTE — BH INPATIENT PSYCHIATRY PROGRESS NOTE - NSBHFUPINTERVALHXFT_PSY_A_CORE
Chart reviewed including pertinent labs, imaging, ekg. case discussed with treatment team.  Over this interval: patient seen with team s/w in discussing ongoing treatment and dispo planning. Patient remains discharge focused. She was able to reflect and agree that her family was concerned about her wellbeing. She feels she is ready to be discharged. States she has numerous plans this weekend including going to her orthodontics appt and family wedding. PAtient initially minimizing but able to verbalize she is "discrete" with her family, but that she is frustrated with being in the hospital, feels at times she is not being adequately heard. Able to reflect that she has anger issues at home and that her anger is triggered when she is hospitalized. We discuss partial and while she is amenable, she is focused on the quickest route to discharge. She denies SI and HI. Denies AVH.

## 2022-02-02 NOTE — CHART NOTE - NSCHARTNOTEFT_GEN_A_CORE
Pt reporting dysuria. UA positive. Plan to start macrobid 100mg BID for 5 days. Pending urine culture. Will check urine culture and change antibiotics if necessary.
UCx resulted as <10,000 urogenital nanda. Macrobid for UTI discontinued.

## 2022-02-02 NOTE — BH INPATIENT PSYCHIATRY PROGRESS NOTE - CURRENT MEDICATION
MEDICATIONS  (STANDING):  clonazePAM  Tablet 1 milliGRAM(s) Oral at bedtime  influenza   Vaccine 0.5 milliLiter(s) IntraMuscular once  lithium 450 milliGRAM(s) Oral at bedtime  lithium 300 milliGRAM(s) Oral daily  multivitamin 1 Tablet(s) Oral daily  nicotine -   7 mG/24Hr(s) Patch 1 patch Transdermal daily  QUEtiapine 500 milliGRAM(s) Oral at bedtime    MEDICATIONS  (PRN):  acetaminophen     Tablet .. 650 milliGRAM(s) Oral every 6 hours PRN Mild Pain (1 - 3), Moderate Pain (4 - 6)  ALBUTerol    90 MICROgram(s) HFA Inhaler 2 Puff(s) Inhalation every 6 hours PRN Shortness of Breath and/or Wheezing  artificial  tears Solution 1 Drop(s) Both EYES four times a day PRN dry eys  BACItracin   Ointment 1 Application(s) Topical every 8 hours PRN wound care  diphenhydrAMINE 50 milliGRAM(s) Oral every 6 hours PRN anxiety, agitation  diphenhydrAMINE Injectable 50 milliGRAM(s) IntraMuscular once PRN severe anxiety, agitation  guaiFENesin Oral Liquid (Sugar-Free) 200 milliGRAM(s) Oral every 6 hours PRN cough  haloperidol     Tablet 5 milliGRAM(s) Oral every 6 hours PRN agitation, anxiety  haloperidol    Injectable 5 milliGRAM(s) IntraMuscular Once PRN severe agitation, severe anxiety  LORazepam     Tablet 2 milliGRAM(s) Oral every 6 hours PRN Anxiety  nicotine  Polacrilex Gum 4 milliGRAM(s) Oral every 4 hours PRN Craving  sodium chloride 0.65% Nasal 1 Spray(s) Both Nostrils two times a day PRN Congestion

## 2022-02-02 NOTE — BH INPATIENT PSYCHIATRY PROGRESS NOTE - NSBHASSESSSUMMFT_PSY_ALL_CORE
21/F with hx of bipolar disorder with PF, multiple past psych admissions including most recent [facility] discharge x 12 days ago, no reported hx of SA, and hx of alcohol/ cannabis abuse.  Today, presented to the ED BIB parents due to worsening agitated behavior in the context of a manic episode.    remains discharge focused. Able to verbalize she has anger issues that are triggered by setting (hospital / home). Continues to verbalize that she is here for a nebulizer but when confronted that her family was concerned about her mental health, she admits that is the reason why she is hospitalized. this is suggestive of improving insight. dispo planning in progress     no CO indicated   c/w Seroquel 500mg qhs   c/w Lithium 300mg daily + 450mg qhs  Clonazepam 1mg qhs   haldol 5 / lorazepam 2 PRN for agitation   albuterol PRN for asthma   coordinate with s/w

## 2022-02-02 NOTE — BH INPATIENT PSYCHIATRY PROGRESS NOTE - MSE UNSTRUCTURED FT
Dressed appropriately.  Good hygiene and grooming.  behaviorally more calm, cooperative. speech normal tone + rate. appropriate eye contact. psychomotor normal. No abnormal movements noted. Mood is "frustrated," calm affect, somewhat constricted.  Linear TP, fair associations.  TC: Denies SIIP or HIIP.  Denies AVH.  Insight fair and appears improving, and judgment appropriate in relation to med adherence, impulse control fair. attention fair, language fluent, gait intact.

## 2022-02-03 RX ORDER — QUETIAPINE FUMARATE 200 MG/1
450 TABLET, FILM COATED ORAL AT BEDTIME
Refills: 0 | Status: DISCONTINUED | OUTPATIENT
Start: 2022-02-03 | End: 2022-02-07

## 2022-02-03 RX ADMIN — LITHIUM CARBONATE 450 MILLIGRAM(S): 300 TABLET, EXTENDED RELEASE ORAL at 20:02

## 2022-02-03 RX ADMIN — Medication 1 PATCH: at 18:09

## 2022-02-03 RX ADMIN — Medication 50 MILLIGRAM(S): at 09:23

## 2022-02-03 RX ADMIN — Medication 1 PATCH: at 09:30

## 2022-02-03 RX ADMIN — QUETIAPINE FUMARATE 450 MILLIGRAM(S): 200 TABLET, FILM COATED ORAL at 20:02

## 2022-02-03 RX ADMIN — Medication 1 MILLIGRAM(S): at 20:02

## 2022-02-03 RX ADMIN — Medication 1 SPRAY(S): at 09:24

## 2022-02-03 RX ADMIN — ALBUTEROL 2 PUFF(S): 90 AEROSOL, METERED ORAL at 09:24

## 2022-02-03 RX ADMIN — Medication 1 DROP(S): at 09:24

## 2022-02-03 RX ADMIN — Medication 2 MILLIGRAM(S): at 00:27

## 2022-02-03 RX ADMIN — Medication 1 TABLET(S): at 09:20

## 2022-02-03 RX ADMIN — Medication 1 PATCH: at 09:19

## 2022-02-03 RX ADMIN — LITHIUM CARBONATE 300 MILLIGRAM(S): 300 TABLET, EXTENDED RELEASE ORAL at 09:19

## 2022-02-03 NOTE — BH INPATIENT PSYCHIATRY PROGRESS NOTE - NSBHASSESSSUMMFT_PSY_ALL_CORE
21/F with hx of bipolar disorder with PF, multiple past psych admissions including most recent [facility] discharge x 12 days ago, no reported hx of SA, and hx of alcohol/ cannabis abuse.  Today, presented to the ED BIB parents due to worsening agitated behavior in the context of a manic episode.    adherent to meds, no overt behavioral issues, remains discharge focused and was under the impression she was leaving today. requests decreased Seroquel because of weight gain. Will decrease Seroquel to 450mg qhs and obtain Li levels     no CO indicated   c/w Seroquel 500mg qhs   c/w Lithium 300mg daily + 450mg qhs  Clonazepam 1mg qhs   haldol 5 / lorazepam 2 PRN for agitation   albuterol PRN for asthma   coordinate with s/w

## 2022-02-03 NOTE — BH INPATIENT PSYCHIATRY PROGRESS NOTE - MSE UNSTRUCTURED FT
Dressed appropriately.  Good hygiene and grooming.  behaviorally more calm, cooperative. speech normal tone + rate. appropriate eye contact. psychomotor normal. No abnormal movements noted. Mood is "fine," calm affect, somewhat constricted.  Linear TP, fair associations.  TC: Denies SIIP or HIIP.  Denies AVH.  Insight fair and appears improving, and judgment appropriate in relation to med adherence, impulse control fair. attention fair, language fluent, gait intact.

## 2022-02-03 NOTE — BH INPATIENT PSYCHIATRY PROGRESS NOTE - NSBHFUPINTERVALHXFT_PSY_A_CORE
Chart reviewed including pertinent labs, imaging, ekg. case discussed with treatment team.  Over this interval: no overt behavioral issues; patient adherent to meds. requests decrease in Seroquel. disappointed to hear she is not leaving today. denies SI and HI

## 2022-02-04 PROCEDURE — 99231 SBSQ HOSP IP/OBS SF/LOW 25: CPT

## 2022-02-04 RX ORDER — LITHIUM CARBONATE 300 MG/1
1 TABLET, EXTENDED RELEASE ORAL
Qty: 14 | Refills: 0
Start: 2022-02-04 | End: 2022-02-17

## 2022-02-04 RX ORDER — CLONAZEPAM 1 MG
1 TABLET ORAL
Qty: 14 | Refills: 0
Start: 2022-02-04 | End: 2022-02-17

## 2022-02-04 RX ORDER — ALBUTEROL 90 UG/1
2 AEROSOL, METERED ORAL
Qty: 0 | Refills: 0 | DISCHARGE
Start: 2022-02-04

## 2022-02-04 RX ORDER — QUETIAPINE FUMARATE 200 MG/1
1 TABLET, FILM COATED ORAL
Qty: 14 | Refills: 0
Start: 2022-02-04 | End: 2022-02-17

## 2022-02-04 RX ORDER — CLONAZEPAM 1 MG
1 TABLET ORAL AT BEDTIME
Refills: 0 | Status: DISCONTINUED | OUTPATIENT
Start: 2022-02-04 | End: 2022-02-07

## 2022-02-04 RX ADMIN — Medication 1 PATCH: at 08:52

## 2022-02-04 RX ADMIN — QUETIAPINE FUMARATE 450 MILLIGRAM(S): 200 TABLET, FILM COATED ORAL at 20:13

## 2022-02-04 RX ADMIN — ALBUTEROL 2 PUFF(S): 90 AEROSOL, METERED ORAL at 10:01

## 2022-02-04 RX ADMIN — Medication 650 MILLIGRAM(S): at 23:26

## 2022-02-04 RX ADMIN — Medication 1 TABLET(S): at 08:52

## 2022-02-04 RX ADMIN — Medication 1 DROP(S): at 10:00

## 2022-02-04 RX ADMIN — LITHIUM CARBONATE 450 MILLIGRAM(S): 300 TABLET, EXTENDED RELEASE ORAL at 20:14

## 2022-02-04 RX ADMIN — Medication 1 SPRAY(S): at 10:00

## 2022-02-04 RX ADMIN — LITHIUM CARBONATE 300 MILLIGRAM(S): 300 TABLET, EXTENDED RELEASE ORAL at 08:52

## 2022-02-04 RX ADMIN — Medication 1 PATCH: at 10:22

## 2022-02-04 RX ADMIN — Medication 1 MILLIGRAM(S): at 20:13

## 2022-02-04 NOTE — BH TREATMENT PLAN - NSTXDCOPLKINTERSW_PSY_ALL_CORE
SW will meet with pt to provide psychoed and support towards pt's goal. SW will identify and create appropriate referrals for follow-up care. SW meet with multidisciplinary team daily for updates on pt's goal progress.

## 2022-02-04 NOTE — BH TREATMENT PLAN - NSTXSLPPATGOAL_PSY_ALL_CORE
Be able to sleep for a minimum of six hours daily

## 2022-02-04 NOTE — BH INPATIENT PSYCHIATRY DISCHARGE NOTE - NSBHDCRISKMITIGATE_PSY_ALL_CORE
Safety planning/Reduction in access to lethal methods (pills, firearms, etc)/Referral to PHP/Family/Other social support involvement/Medications targeting suicidality/non-suicidal self injurious behavior

## 2022-02-04 NOTE — BH TREATMENT PLAN - NSTXSLPPATINTERRN_PSY_ALL_CORE
Pt encouraged to tend to sleep hygiene.
Encourage to practice elaxation exercice  before going to bed.
Pt encouraged to tend to sleep hygiene.
Encourage to practice elaxation exercice  before going to bed.

## 2022-02-04 NOTE — BH INPATIENT PSYCHIATRY DISCHARGE NOTE - NSDCRECOMMENDMEDICALFT_PSY_ALL_CORE
-Please take medications as reconciled  -Please follow up with your primary care doctor as necessary  -Please follow up with your psychiatric appointment as detailed  -Please refrain from driving or operating heavy machinery until you speak with your outpatient provider. Some of your medications may be sedating  -Please go to nearest ED or call 911 in case of psychiatric or medical emergency.

## 2022-02-04 NOTE — BH INPATIENT PSYCHIATRY DISCHARGE NOTE - NSBHMETABOLIC_PSY_ALL_CORE_FT
BMI: BMI (kg/m2): 27.4 (01-26-22 @ 18:10)  HbA1c: A1C with Estimated Average Glucose Result: 5.3 % (01-04-22 @ 10:27)    Glucose:   BP: 103/76 (02-04-22 @ 08:36) (103/76 - 103/76)  Lipid Panel: Date/Time: 01-04-22 @ 10:27  Cholesterol, Serum: 142  Direct LDL: --  HDL Cholesterol, Serum: 51  Total Cholesterol/HDL Ration Measurement: --  Triglycerides, Serum: 61

## 2022-02-04 NOTE — BH TREATMENT PLAN - NSBHPRIMARYDX_PSY_ALL_CORE
Bipolar disorder with psychotic features    
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
## 2022-02-04 NOTE — BH INPATIENT PSYCHIATRY PROGRESS NOTE - MSE UNSTRUCTURED FT
Dressed appropriately.  Good hygiene and grooming.  behaviorally more calm, cooperative. speech normal tone + rate. appropriate eye contact. psychomotor normal. No abnormal movements noted. Mood is "ok" calm affect, somewhat constricted.  Linear TP, fair associations.  TC: Denies SIIP or HIIP.  Denies AVH.  Insight fair and appears improving, and judgment appropriate in relation to med adherence, impulse control fair. attention fair, language fluent, gait intact.

## 2022-02-04 NOTE — BH TREATMENT PLAN - NSTXPLANTHERAPYSESSIONSFT_PSY_ALL_CORE
02-02-22  Type of therapy: Creative arts therapy,Music therapy,Psychoeducation  Type of session: Individual  Level of patient participation: Participates  Duration of participation: 15 minutes  Therapy conducted by: Psych rehab  Therapy Summary: Writer met with patient to assess progress towards psychiatric rehabilitation goals during the past seven days. Pt is observed increasingly visible on the unit and is social with staff and peers. Pt frequently reported that she would like to be discharged. Patient has reported daily medication compliance. Pt has attended 70% of psychoeducational groups within the past 7 days. Pt has been working on making at least 3 reality and goal oriented statements. Pt has made good progress. During the interview with writer, pt spoke about her relationship with her boyfriend, having a dentist appointment, and keeping herself occupied on the milieu. Pt has met goal, goal will be discontinued, and goal will be modified to identifying and utilizing coping skills to meet her needs. Pt denied SI/HI/AH/VH. Psych rehab staff will continue to meet with patient within the next 7 days for encouragement, support, and psychotherapy.  
non-tender/non-distended

## 2022-02-04 NOTE — BH INPATIENT PSYCHIATRY DISCHARGE NOTE - NSBHDCHANDOFFFT_PSY_ALL_CORE
DC summary to be faxed over to [facility] IOP program. Inpatient team can be contacted at [phone]

## 2022-02-04 NOTE — BH INPATIENT PSYCHIATRY DISCHARGE NOTE - NSDCCPCAREPLAN_GEN_ALL_CORE_FT
PRINCIPAL DISCHARGE DIAGNOSIS  Diagnosis: Severe manic bipolar 1 disorder with psychotic behavior  Assessment and Plan of Treatment: Seroquel + lithium

## 2022-02-04 NOTE — BH INPATIENT PSYCHIATRY PROGRESS NOTE - NSBHASSESSSUMMFT_PSY_ALL_CORE
21/F with hx of bipolar disorder with PF, multiple past psych admissions including most recent [facility] discharge x 12 days ago, no reported hx of SA, and hx of alcohol/ cannabis abuse.  Today, presented to the ED BIB parents due to worsening agitated behavior in the context of a manic episode.    Family meeting conducted (30 mins), where treatment, expectations, disposition planning discussed. Patient remains discharge focused, able to accept staying this weekend with proposed discharge date 2/7/22. no overt manic symptoms present. will obtain Li level 2/6/22.     no CO indicated   c/w Seroquel 500mg qhs   c/w Lithium 300mg daily + 450mg qhs  Clonazepam 1mg qhs   haldol 5 / lorazepam 2 PRN for agitation   albuterol PRN for asthma   coordinate with s/w

## 2022-02-04 NOTE — BH TREATMENT PLAN - NSTXDISORGINTERPR_PSY_ALL_CORE
Over the next 7 days, Psychiatric Rehabilitation staff will utilize group and individual psychotherapy, and psychoeducation to assist the patient in reaching her treatment goal.
Pt has met goal, goal will be discontinued, and pt's goal will be modified within the coping skills parameter.

## 2022-02-04 NOTE — BH TREATMENT PLAN - NSTXPATIENTPARTICIPATE_PSY_ALL_CORE
Patient participated in identification of needs/problems/goals for treatment
Patient participated in defining interventions

## 2022-02-04 NOTE — BH TREATMENT PLAN - NSTXMANICINTERRN_PSY_ALL_CORE
Pt encouraged to verbalize all feelings of distress and staff will provide reality orientation as needed.
Pt encouraged to verbalize all feelings of distress and staff will provide reality orientation as needed.

## 2022-02-04 NOTE — BH INPATIENT PSYCHIATRY DISCHARGE NOTE - NSDCMRMEDTOKEN_GEN_ALL_CORE_FT
albuterol 90 mcg/inh inhalation aerosol: 2 puff(s) inhaled every 6 hours, As needed, Shortness of Breath and/or Wheezing  clonazePAM 1 mg oral tablet: 1 tab(s) orally once a day (at bedtime) MDD:1mg  lithium 300 mg oral tablet, extended release: 1 tab(s) orally once a day MDD:total 750mg daily  lithium 450 mg oral tablet, extended release: 1 tab(s) orally once a day (at bedtime) MDD:total 750mg daily  QUEtiapine 400 mg oral tablet, extended release: 1 tab(s) orally once a day (at bedtime) MDD:total 450mg nightly  QUEtiapine 50 mg oral tablet, extended release: 1 tab(s) orally once a day (at bedtime) MDD:total 450mg nightly

## 2022-02-04 NOTE — BH INPATIENT PSYCHIATRY PROGRESS NOTE - CURRENT MEDICATION
MEDICATIONS  (STANDING):  clonazePAM  Tablet 1 milliGRAM(s) Oral at bedtime  influenza   Vaccine 0.5 milliLiter(s) IntraMuscular once  lithium 450 milliGRAM(s) Oral at bedtime  lithium 300 milliGRAM(s) Oral daily  multivitamin 1 Tablet(s) Oral daily  nicotine -   7 mG/24Hr(s) Patch 1 patch Transdermal daily  QUEtiapine 450 milliGRAM(s) Oral at bedtime    MEDICATIONS  (PRN):  acetaminophen     Tablet .. 650 milliGRAM(s) Oral every 6 hours PRN Mild Pain (1 - 3), Moderate Pain (4 - 6)  ALBUTerol    90 MICROgram(s) HFA Inhaler 2 Puff(s) Inhalation every 6 hours PRN Shortness of Breath and/or Wheezing  artificial  tears Solution 1 Drop(s) Both EYES four times a day PRN dry eys  BACItracin   Ointment 1 Application(s) Topical every 8 hours PRN wound care  diphenhydrAMINE 50 milliGRAM(s) Oral every 6 hours PRN anxiety, agitation  diphenhydrAMINE Injectable 50 milliGRAM(s) IntraMuscular once PRN severe anxiety, agitation  guaiFENesin Oral Liquid (Sugar-Free) 200 milliGRAM(s) Oral every 6 hours PRN cough  haloperidol     Tablet 5 milliGRAM(s) Oral every 6 hours PRN agitation, anxiety  haloperidol    Injectable 5 milliGRAM(s) IntraMuscular Once PRN severe agitation, severe anxiety  LORazepam     Tablet 2 milliGRAM(s) Oral every 6 hours PRN Anxiety  nicotine  Polacrilex Gum 4 milliGRAM(s) Oral every 4 hours PRN Craving  sodium chloride 0.65% Nasal 1 Spray(s) Both Nostrils two times a day PRN Congestion

## 2022-02-04 NOTE — BH INPATIENT PSYCHIATRY DISCHARGE NOTE - HOSPITAL COURSE
Patient presenting with decompensated hemant. She was recently discharged from inpatient hospitalization and connected to [facility] but missed intake due to patient being brought to [facility] ED for manic symptoms. Miquelint subsequently admitted to 2 and transferred to  due to administrative needs. Patient on initial eval with poor insight, reporting she is admitted due to need to get refill of albuterol pump. She was initially hesitant on starting Lithium but with counseling and psychoed, agreed to Li trial. She was also continued on Seroquel 500mg qhs. Patient expressed dissatisfaction with Seroquel and weight gain. She was discharge focused and had numerous reasons why she immediately needed to be discharged. She was initially minimizing symptoms but as hospitalization progressed, able to verbalize she has anger issues triggered by hospitalization and also due to family members. She was able to verbalize that the reason for hospitalization was due to family concerns about her psychiatric health. Patient's Li levels were trended and doses adjusted based on levels. Patient given gentle counseling on setting realistic expectations. She was on board with being connected to [facility]. Family meeting conducted on 2/4/22. Presently, patient's safety has been assured, meds optimized, symptoms controlled, patient is not acutely suicidal nor is she expressing homicidal ideation, able to demonstrate ability to care for self and suitable for ongoing psychiatric care in a setting that is less restrictive. Patient counseled on importance of refraining from driving or operating heavy machinery until they speak with their outpatient providers. Patient instructed to discuss with her medical providers if she is planning on becoming pregnant as medications can impact fetal development.     Safety planning conducted with pt, and discharge was discussed with multi-disciplinary team. Pt was visible on unit and social with select peers, attended groups, has been in appropriate behavioral control, was future oriented, and on discharge able to consistently deny suicidality and homicidality, and maintained ADLs independently. Patient progressed well and their symptoms stabilized by time of discharge. Patient was adherent with medications and by day of discharge, did not have any active psychiatric symptoms. Patient with improved mental status exam and with improved insight judgement and impulse control. Patient able to participate in the modalities of therapy offered in this inpatient setting. They are able to participate in safe disposition planning. At present, patient is not a danger to self or others, has achieved optimal benefits from hospitalization, and thus appropriate for less restrictive level of care.     Patient is at chronically elevated risk for self-harm due to female, sex, psych dx, hx of non-adherence to treatment, substance use hx and propensity for relapse, ?cluster B personality traits, financial stressors, hx of impulsivity, poor insight when non-adherent to medications. At this time, those risks are mitigated by patient’s expressed desire to live. Patient currently denies SIIP.    Protective factors include current stability of symptoms, adherence to medications at this time, future oriented thinking, improved frustration tolerance as evidenced by no recent behavioral issues on the unit, improved insight and judgement, abstinence from substance while in a controlled setting, Mu-ism and spirituality, supportive family members, access to treatment and care, no access to fire arms    At present, patient has remained in good behavioral control while inpatient. They have not recently displayed any concerning symptoms of behavioral dysregulation or decompensation, has been actively participating in the milieu, expressing future oriented thinking and is insightful about self-harm. Currently, patient is adamant about their denial of suicidal ideation intent or plan. They are not at acutely elevated risk for self-harm at this time.     low acute risk for violence. risk factors include psychiatric diagnosis, hx of substance use, limited insight when med-nonadherent, impulsivity and affective dysregulation (when med-nonadherent). Those risks mitigated as patient currently does not endorse harm to others, has intact reality testing, intact insight, understands the consequences of harm to others, and is future oriented. Patient presenting with decompensated hemant. She was recently discharged from inpatient hospitalization and connected to [facility] but missed intake due to patient being brought to [facility] ED for manic symptoms. Miquelint subsequently admitted to 2 and transferred to  due to administrative needs. Patient on initial eval with poor insight, reporting she is admitted due to need to get refill of albuterol pump. She was initially hesitant on starting Lithium but with counseling and psychoed, agreed to Li trial. She was also continued on Seroquel 500mg qhs. Patient expressed dissatisfaction with Seroquel and weight gain. She was discharge focused and had numerous reasons why she immediately needed to be discharged. She was initially minimizing symptoms but as hospitalization progressed, able to verbalize she has anger issues triggered by hospitalization and also due to family members. She was able to verbalize that the reason for hospitalization was due to family concerns about her psychiatric health. The patient's lithium was increased to 300mg in the morning, and 450mg at hs.  Level on day of discharge was 0.6.  Patient given gentle counseling on setting realistic expectations. She was on board with being connected to [facility]. Family meeting conducted on 2/4/22. Presently, patient's safety has been assured, meds optimized, symptoms controlled, patient is not acutely suicidal nor is she expressing homicidal ideation, able to demonstrate ability to care for self and suitable for ongoing psychiatric care in a setting that is less restrictive. Patient counseled on importance of refraining from driving or operating heavy machinery until they speak with their outpatient providers. Patient instructed to discuss with her medical providers if she is planning on becoming pregnant as medications can impact fetal development.     Safety planning conducted with pt, and discharge was discussed with multi-disciplinary team. Pt was visible on unit and social with select peers, attended groups, has been in appropriate behavioral control, was future oriented, and on discharge able to consistently deny suicidality and homicidality, and maintained ADLs independently. Patient progressed well and their symptoms stabilized by time of discharge. Patient was adherent with medications and by day of discharge, did not have any active psychiatric symptoms. Patient with improved mental status exam and with improved insight judgement and impulse control. Patient able to participate in the modalities of therapy offered in this inpatient setting. They are able to participate in safe disposition planning. At present, patient is not a danger to self or others, has achieved optimal benefits from hospitalization, and thus appropriate for less restrictive level of care.     Patient is at chronically elevated risk for self-harm due to female, sex, psych dx, hx of non-adherence to treatment, substance use hx and propensity for relapse, ?cluster B personality traits, financial stressors, hx of impulsivity, poor insight when non-adherent to medications. At this time, those risks are mitigated by patient’s expressed desire to live. Patient currently denies SIIP.    Protective factors include current stability of symptoms, adherence to medications at this time, future oriented thinking, improved frustration tolerance as evidenced by no recent behavioral issues on the unit, improved insight and judgement, abstinence from substance while in a controlled setting, Bahai and spirituality, supportive family members, access to treatment and care, no access to fire arms    At present, patient has remained in good behavioral control while inpatient. They have not recently displayed any concerning symptoms of behavioral dysregulation or decompensation, has been actively participating in the milieu, expressing future oriented thinking and is insightful about self-harm. Currently, patient is adamant about their denial of suicidal ideation intent or plan. They are not at acutely elevated risk for self-harm at this time.     low acute risk for violence. risk factors include psychiatric diagnosis, hx of substance use, limited insight when med-nonadherent, impulsivity and affective dysregulation (when med-nonadherent). Those risks mitigated as patient currently does not endorse harm to others, has intact reality testing, intact insight, understands the consequences of harm to others, and is future oriented.

## 2022-02-04 NOTE — BH TREATMENT PLAN - NSPTSTATEDGOAL_PSY_ALL_CORE
Patient seeks psychiatric stabilization to return to the community with appropriate aftercare and supports.

## 2022-02-04 NOTE — BH TREATMENT PLAN - NSTXCOPEINTERPR_PSY_ALL_CORE
Psych rehab staff will continue to support and provide patient with psycho-education in individual and group sessions in effort to facilitate patient progress towards goal.

## 2022-02-04 NOTE — BH TREATMENT PLAN - NSTXDISORGINTERRN_PSY_ALL_CORE
Pt encouraged to verbalize thoughts and perception of reality and staff will re-orient pt to reality as needed.
Pt encouraged to verbalize thoughts and perception of reality and staff will re-orient pt to reality as needed.

## 2022-02-04 NOTE — BH TREATMENT PLAN - NSTXDISORGGOAL_PSY_ALL_CORE
Will make at least 3 goal and reality oriented statements during therapy

## 2022-02-04 NOTE — BH INPATIENT PSYCHIATRY PROGRESS NOTE - NSBHFUPINTERVALHXFT_PSY_A_CORE
Chart reviewed including pertinent labs, imaging, ekg. case discussed with treatment team.  Over this interval: pt adherent to meds, remains focused on discharge, wants Seroquel dose decreased. Family meeting conducted with primary team (MD + S/W), patient, patient' sister  + father. We discussed familial and patient concerns, expectations, disposition planning and the need for ongoing treatment. Everyone is agreeable to continuing current med regimen with anticipated discharge 2/7/22 after which patient will be connected to IOP slated to start 2/8/22. Patient denies racing thoughts, AVH, SI or HI.

## 2022-02-05 RX ADMIN — Medication 4 MILLIGRAM(S): at 18:05

## 2022-02-05 RX ADMIN — Medication 1 TABLET(S): at 10:59

## 2022-02-05 RX ADMIN — Medication 1 MILLIGRAM(S): at 21:17

## 2022-02-05 RX ADMIN — QUETIAPINE FUMARATE 450 MILLIGRAM(S): 200 TABLET, FILM COATED ORAL at 21:18

## 2022-02-05 RX ADMIN — Medication 1 DROP(S): at 18:06

## 2022-02-05 RX ADMIN — LITHIUM CARBONATE 450 MILLIGRAM(S): 300 TABLET, EXTENDED RELEASE ORAL at 21:17

## 2022-02-05 RX ADMIN — Medication 1 SPRAY(S): at 18:06

## 2022-02-05 RX ADMIN — Medication 1 PATCH: at 11:14

## 2022-02-05 RX ADMIN — ALBUTEROL 2 PUFF(S): 90 AEROSOL, METERED ORAL at 18:06

## 2022-02-05 RX ADMIN — LITHIUM CARBONATE 300 MILLIGRAM(S): 300 TABLET, EXTENDED RELEASE ORAL at 10:59

## 2022-02-06 RX ADMIN — LITHIUM CARBONATE 300 MILLIGRAM(S): 300 TABLET, EXTENDED RELEASE ORAL at 08:38

## 2022-02-06 RX ADMIN — LITHIUM CARBONATE 450 MILLIGRAM(S): 300 TABLET, EXTENDED RELEASE ORAL at 20:36

## 2022-02-06 RX ADMIN — ALBUTEROL 2 PUFF(S): 90 AEROSOL, METERED ORAL at 18:01

## 2022-02-06 RX ADMIN — Medication 1 DROP(S): at 18:01

## 2022-02-06 RX ADMIN — QUETIAPINE FUMARATE 450 MILLIGRAM(S): 200 TABLET, FILM COATED ORAL at 20:36

## 2022-02-06 RX ADMIN — Medication 1 PATCH: at 08:38

## 2022-02-06 RX ADMIN — Medication 1 SPRAY(S): at 18:01

## 2022-02-06 RX ADMIN — Medication 1 MILLIGRAM(S): at 20:36

## 2022-02-06 RX ADMIN — Medication 1 TABLET(S): at 08:39

## 2022-02-06 RX ADMIN — Medication 1 PATCH: at 19:24

## 2022-02-07 VITALS — TEMPERATURE: 98 F

## 2022-02-07 LAB
ANION GAP SERPL CALC-SCNC: 12 MMOL/L — SIGNIFICANT CHANGE UP (ref 7–14)
BUN SERPL-MCNC: 11 MG/DL — SIGNIFICANT CHANGE UP (ref 7–23)
CALCIUM SERPL-MCNC: 9.3 MG/DL — SIGNIFICANT CHANGE UP (ref 8.4–10.5)
CHLORIDE SERPL-SCNC: 101 MMOL/L — SIGNIFICANT CHANGE UP (ref 98–107)
CO2 SERPL-SCNC: 20 MMOL/L — LOW (ref 22–31)
CREAT SERPL-MCNC: 0.65 MG/DL — SIGNIFICANT CHANGE UP (ref 0.5–1.3)
GLUCOSE SERPL-MCNC: 124 MG/DL — HIGH (ref 70–99)
LITHIUM SERPL-MCNC: 0.6 MMOL/L — SIGNIFICANT CHANGE UP (ref 0.6–1.2)
POTASSIUM SERPL-MCNC: 3.9 MMOL/L — SIGNIFICANT CHANGE UP (ref 3.5–5.3)
POTASSIUM SERPL-SCNC: 3.9 MMOL/L — SIGNIFICANT CHANGE UP (ref 3.5–5.3)
SODIUM SERPL-SCNC: 133 MMOL/L — LOW (ref 135–145)

## 2022-02-07 PROCEDURE — 99238 HOSP IP/OBS DSCHRG MGMT 30/<: CPT

## 2022-02-07 RX ADMIN — Medication 1 PATCH: at 10:00

## 2022-02-07 RX ADMIN — Medication 1 DROP(S): at 10:10

## 2022-02-07 RX ADMIN — Medication 1 TABLET(S): at 10:03

## 2022-02-07 RX ADMIN — ALBUTEROL 2 PUFF(S): 90 AEROSOL, METERED ORAL at 10:11

## 2022-02-07 RX ADMIN — LITHIUM CARBONATE 300 MILLIGRAM(S): 300 TABLET, EXTENDED RELEASE ORAL at 10:09

## 2022-02-07 RX ADMIN — Medication 1 SPRAY(S): at 10:10

## 2022-02-07 RX ADMIN — Medication 1 PATCH: at 10:09

## 2022-02-07 NOTE — BH DISCHARGE NOTE NURSING/SOCIAL WORK/PSYCH REHAB - NSDCPRGOAL_PSY_ALL_CORE
Patient has demonstrated progress towards psychiatric rehabilitation goals during current hospitalization. Patient has demonstrated daily medication compliance and is tolerating medications well. Patient reports improvements hemant compared to admission. Patient was able to identify meditation, reading the bible/engaging in spiritual activities and cooking as effective coping skills to manage symptoms. Patient reports feeling confident in ability to utilize coping skills post discharge. Patient has attended approximately 45 percent of psychiatric rehabilitation groups during current hospitalization. Patient was verbal and engaged in group discussions and activities. Patient was able to tolerate group structure and did not require redirection. Patient was visible in the milieu. Patient increasingly socialized with select peers appropriately. Patient was able to make needs known to staff. Patient has demonstrated improved insight into the current episode and was able to identify hemant symptoms such as not sleeping, becoming hyperverbal and having high energy levels as warning signs that a crisis may be developing. Patient expressed readiness for discharge. Patient was provided with a [employer] survey to complete prior to discharge.

## 2022-02-07 NOTE — BH DISCHARGE NOTE NURSING/SOCIAL WORK/PSYCH REHAB - NSDCPRRECOMMEND_PSY_ALL_CORE
Psychiatric rehabilitation staff recommends that patient continue to demonstrate daily medication compliance and utilize identified coping skills to manage symptoms. Patient would benefit from attending [facility] for ongoing medication management, support and psychotherapy.

## 2022-02-07 NOTE — BH INPATIENT PSYCHIATRY PROGRESS NOTE - CURRENT MEDICATION
MEDICATIONS  (STANDING):  clonazePAM  Tablet 1 milliGRAM(s) Oral at bedtime  influenza   Vaccine 0.5 milliLiter(s) IntraMuscular once  lithium 450 milliGRAM(s) Oral at bedtime  lithium 300 milliGRAM(s) Oral daily  multivitamin 1 Tablet(s) Oral daily  nicotine -   7 mG/24Hr(s) Patch 1 patch Transdermal daily  QUEtiapine 450 milliGRAM(s) Oral at bedtime    MEDICATIONS  (PRN):  acetaminophen     Tablet .. 650 milliGRAM(s) Oral every 6 hours PRN Mild Pain (1 - 3), Moderate Pain (4 - 6)  ALBUTerol    90 MICROgram(s) HFA Inhaler 2 Puff(s) Inhalation every 6 hours PRN Shortness of Breath and/or Wheezing  artificial  tears Solution 1 Drop(s) Both EYES four times a day PRN dry eys  BACItracin   Ointment 1 Application(s) Topical every 8 hours PRN wound care  diphenhydrAMINE 50 milliGRAM(s) Oral every 6 hours PRN anxiety, agitation  diphenhydrAMINE Injectable 50 milliGRAM(s) IntraMuscular once PRN severe anxiety, agitation  guaiFENesin Oral Liquid (Sugar-Free) 200 milliGRAM(s) Oral every 6 hours PRN cough  haloperidol     Tablet 5 milliGRAM(s) Oral every 6 hours PRN agitation, anxiety  haloperidol    Injectable 5 milliGRAM(s) IntraMuscular Once PRN severe agitation, severe anxiety  LORazepam     Tablet 2 milliGRAM(s) Oral every 6 hours PRN Anxiety  LORazepam   Injectable 2 milliGRAM(s) IntraMuscular once PRN agitation  nicotine  Polacrilex Gum 4 milliGRAM(s) Oral every 4 hours PRN Craving  sodium chloride 0.65% Nasal 1 Spray(s) Both Nostrils two times a day PRN Congestion

## 2022-02-07 NOTE — BH INPATIENT PSYCHIATRY PROGRESS NOTE - NSBHCONSBHPROVDETAILS_PSY_A_CORE  FT
emailed Dr Lugo

## 2022-02-07 NOTE — BH INPATIENT PSYCHIATRY PROGRESS NOTE - NSBHCONTPROVIDER_PSY_ALL_CORE
No, attempted...

## 2022-02-07 NOTE — BH INPATIENT PSYCHIATRY PROGRESS NOTE - NSTXPROBSLPPAT_PSY_ALL_CORE
SLEEP PATTERN, DISTURBED

## 2022-02-07 NOTE — BH INPATIENT PSYCHIATRY PROGRESS NOTE - NSTXDISORGGOAL_PSY_ALL_CORE
Will make at least 3 goal and reality oriented statements during therapy

## 2022-02-07 NOTE — BH INPATIENT PSYCHIATRY PROGRESS NOTE - NSBHMETABOLIC_PSY_ALL_CORE_FT
BMI: BMI (kg/m2): 27.4 (01-26-22 @ 18:10)  HbA1c: A1C with Estimated Average Glucose Result: 5.3 % (01-04-22 @ 10:27)    Glucose:   BP: 105/65 (01-25-22 @ 12:44) (105/65 - 105/65)  Lipid Panel: Date/Time: 01-04-22 @ 10:27  Cholesterol, Serum: 142  Direct LDL: --  HDL Cholesterol, Serum: 51  Total Cholesterol/HDL Ration Measurement: --  Triglycerides, Serum: 61  
BMI: BMI (kg/m2): 27.4 (01-26-22 @ 18:10)  HbA1c: A1C with Estimated Average Glucose Result: 5.3 % (01-04-22 @ 10:27)    Glucose:   BP: 120/67 (02-01-22 @ 08:14) (120/67 - 131/60)  Lipid Panel: Date/Time: 01-04-22 @ 10:27  Cholesterol, Serum: 142  Direct LDL: --  HDL Cholesterol, Serum: 51  Total Cholesterol/HDL Ration Measurement: --  Triglycerides, Serum: 61  
BMI: BMI (kg/m2): 27.4 (01-26-22 @ 18:10)  HbA1c: A1C with Estimated Average Glucose Result: 5.3 % (01-04-22 @ 10:27)    Glucose:   BP: 131/60 (01-31-22 @ 08:22) (131/60 - 131/60)  Lipid Panel: Date/Time: 01-04-22 @ 10:27  Cholesterol, Serum: 142  Direct LDL: --  HDL Cholesterol, Serum: 51  Total Cholesterol/HDL Ration Measurement: --  Triglycerides, Serum: 61  
BMI: BMI (kg/m2): 27.4 (01-26-22 @ 18:10)  HbA1c: A1C with Estimated Average Glucose Result: 5.3 % (01-04-22 @ 10:27)    Glucose:   BP: --  Lipid Panel: Date/Time: 01-04-22 @ 10:27  Cholesterol, Serum: 142  Direct LDL: --  HDL Cholesterol, Serum: 51  Total Cholesterol/HDL Ration Measurement: --  Triglycerides, Serum: 61  
BMI: BMI (kg/m2): 27.4 (01-26-22 @ 18:10)  HbA1c: A1C with Estimated Average Glucose Result: 5.3 % (01-04-22 @ 10:27)    Glucose:   BP: 103/76 (02-04-22 @ 08:36) (103/76 - 103/76)  Lipid Panel: Date/Time: 01-04-22 @ 10:27  Cholesterol, Serum: 142  Direct LDL: --  HDL Cholesterol, Serum: 51  Total Cholesterol/HDL Ration Measurement: --  Triglycerides, Serum: 61  
BMI: BMI (kg/m2): 27.4 (01-26-22 @ 18:10)  HbA1c: A1C with Estimated Average Glucose Result: 5.3 % (01-04-22 @ 10:27)    Glucose:   BP: --  Lipid Panel: Date/Time: 01-04-22 @ 10:27  Cholesterol, Serum: 142  Direct LDL: --  HDL Cholesterol, Serum: 51  Total Cholesterol/HDL Ration Measurement: --  Triglycerides, Serum: 61  
BMI: BMI (kg/m2): 27.4 (01-26-22 @ 18:10)  HbA1c: A1C with Estimated Average Glucose Result: 5.3 % (01-04-22 @ 10:27)    Glucose:   BP: 105/65 (01-25-22 @ 12:44) (105/65 - 145/91)  Lipid Panel: Date/Time: 01-04-22 @ 10:27  Cholesterol, Serum: 142  Direct LDL: --  HDL Cholesterol, Serum: 51  Total Cholesterol/HDL Ration Measurement: --  Triglycerides, Serum: 61  
BMI: BMI (kg/m2): 27.4 (01-26-22 @ 18:10)  HbA1c: A1C with Estimated Average Glucose Result: 5.3 % (01-04-22 @ 10:27)    Glucose:   BP: 120/67 (02-01-22 @ 08:14) (120/67 - 131/60)  Lipid Panel: Date/Time: 01-04-22 @ 10:27  Cholesterol, Serum: 142  Direct LDL: --  HDL Cholesterol, Serum: 51  Total Cholesterol/HDL Ration Measurement: --  Triglycerides, Serum: 61  
BMI: BMI (kg/m2): 27.4 (01-26-22 @ 18:10)  HbA1c: A1C with Estimated Average Glucose Result: 5.3 % (01-04-22 @ 10:27)    Glucose:   BP: 120/67 (02-01-22 @ 08:14) (120/67 - 120/67)  Lipid Panel: Date/Time: 01-04-22 @ 10:27  Cholesterol, Serum: 142  Direct LDL: --  HDL Cholesterol, Serum: 51  Total Cholesterol/HDL Ration Measurement: --  Triglycerides, Serum: 61  
BMI: BMI (kg/m2): 27.4 (01-26-22 @ 18:10)  HbA1c: A1C with Estimated Average Glucose Result: 5.3 % (01-04-22 @ 10:27)    Glucose:   BP: 135/62 (02-06-22 @ 08:29) (120/64 - 135/62)  Lipid Panel: Date/Time: 01-04-22 @ 10:27  Cholesterol, Serum: 142  Direct LDL: --  HDL Cholesterol, Serum: 51  Total Cholesterol/HDL Ration Measurement: --  Triglycerides, Serum: 61

## 2022-02-07 NOTE — BH INPATIENT PSYCHIATRY PROGRESS NOTE - NSTXDCOPLKINTERMD_PSY_ALL_CORE
coordinate with s/w

## 2022-02-07 NOTE — BH INPATIENT PSYCHIATRY PROGRESS NOTE - MSE UNSTRUCTURED FT
Dressed appropriately.  Good hygiene and grooming.  behaviorally more calm, cooperative. speech normal tone + rate. appropriate eye contact. psychomotor normal. No abnormal movements noted. Mood is "ok" calm affect, somewhat constricted.  Linear TP, fair associations.  TC: Denies SIIP or HIIP.  Denies AVH.  Insight fair and appears improving, and judgment appropriate in relation to med adherence, impulse control fair. attention fair, language fluent, gait intact.    Dressed appropriately.  Good hygiene and grooming.  Behaviorally more calm, cooperative. speech normal tone + rate. appropriate eye contact. psychomotor normal. No abnormal movements noted. Mood is "ok" calm affect.  Linear TP, fair associations.  TC: Denies SIIP or HIIP.  Denies AVH.  Insight fair and appears improving, and judgment appropriate in relation to med adherence, impulse control fair. attention fair, language fluent, gait intact.

## 2022-02-07 NOTE — BH DISCHARGE NOTE NURSING/SOCIAL WORK/PSYCH REHAB - NSDCPEEMAIL_GEN_ALL_CORE
[facility] for Tobacco Control email [email or web address]

## 2022-02-07 NOTE — BH INPATIENT PSYCHIATRY PROGRESS NOTE - NSTXDISORGDATETRGT_PSY_ALL_CORE
02-Feb-2022
02-Feb-2022
10-Feb-2022
10-Feb-2022
02-Feb-2022
10-Feb-2022
02-Feb-2022
10-Feb-2022
10-Feb-2022
02-Feb-2022

## 2022-02-07 NOTE — BH INPATIENT PSYCHIATRY PROGRESS NOTE - NSTXDCOPLKDATETRGT_PSY_ALL_CORE
02-Feb-2022
07-Feb-2022
02-Feb-2022

## 2022-02-07 NOTE — BH INPATIENT PSYCHIATRY PROGRESS NOTE - NSTXDCOPLKPROGRES_PSY_ALL_CORE
No Change
Met - goal discontinued
No Change

## 2022-02-07 NOTE — BH INPATIENT PSYCHIATRY PROGRESS NOTE - MSE OPTIONS
Unstructured MSE
Structured MSE
Unstructured MSE

## 2022-02-07 NOTE — BH INPATIENT PSYCHIATRY PROGRESS NOTE - NSTXDISORGINTERMD_PSY_ALL_CORE
meds + therapy

## 2022-02-07 NOTE — BH INPATIENT PSYCHIATRY PROGRESS NOTE - NSBHFUPINTERVALHXFT_PSY_A_CORE
Chart reviewed including pertinent labs, imaging, ekg. case discussed with treatment team.  Over this interval: pt adherent to meds, remains focused on discharge, wants Seroquel dose decreased. Family meeting conducted with primary team (MD + S/W), patient, patient' sister  + father. We discussed familial and patient concerns, expectations, disposition planning and the need for ongoing treatment. Everyone is agreeable to continuing current med regimen with anticipated discharge 2/7/22 after which patient will be connected to IOP slated to start 2/8/22. Patient denies racing thoughts, AVH, SI or HI.  Patient seen for follow up for hematn, chart reviewed, and case discussed with treatment team.  No events reported overnight.  The patient states she has been doing well, feels a lot more calm and stable in her mood.  The patient denies any depression, no SI, and behavior has been well controlled.  The patient is happy to have started Lithium, and feels it has been helping.  She denies any side effects.  The patient reports eating and sleeping well.  She has been compliant with medications, tolerating them well.  Li level 0.6.

## 2022-02-07 NOTE — BH DISCHARGE NOTE NURSING/SOCIAL WORK/PSYCH REHAB - PATIENT PORTAL LINK FT
You can access the FollowMyHealth Patient Portal offered by [facility] by registering at the following website: [email or web address]. By joining [facility]’s FollowMyHealth portal, you will also be able to view your health information using other applications (apps) compatible with our system.

## 2022-02-07 NOTE — BH DISCHARGE NOTE NURSING/SOCIAL WORK/PSYCH REHAB - DISCHARGE INSTRUCTIONS AFTERCARE APPOINTMENTS
In order to check the location, date, or time of your aftercare appointment, please refer to your Discharge Instructions Document given to you upon leaving the hospital.  If you have lost the instructions please call [phone]

## 2022-02-07 NOTE — BH INPATIENT PSYCHIATRY PROGRESS NOTE - NSICDXBHPRIMARYDX_PSY_ALL_CORE
Bipolar disorder with psychotic features   F31.9  

## 2022-02-07 NOTE — BH INPATIENT PSYCHIATRY PROGRESS NOTE - NSTXDCOPLKDATEEST_PSY_ALL_CORE
26-Jan-2022

## 2022-02-07 NOTE — BH INPATIENT PSYCHIATRY PROGRESS NOTE - NSTXSLPPATGOAL_PSY_ALL_CORE
Be able to sleep for a minimum of six hours daily

## 2022-02-07 NOTE — BH DISCHARGE NOTE NURSING/SOCIAL WORK/PSYCH REHAB - NSDCPEWEB_GEN_ALL_CORE
[facility] for Tobacco Control website --- [email or web address] website --- [email or web address]

## 2022-02-07 NOTE — BH DISCHARGE NOTE NURSING/SOCIAL WORK/PSYCH REHAB - NSCDUDCCRISIS_PSY_A_CORE
[facility]  1 (315) [facility]-WELL ([phone])  Text "WELL" to 87098  Website: www.Talk[employer]/.[employer] 1 (192) 971-AFXK (6095) Website: www.safehorizon.org/.National Suicide Prevention Lifeline 5 (760) 057-9692/.  [employer]  1 (000) LIFENET (589-6081)/.  [facility]  [phone] [place] [address] [phone]   Hours:  Monday through Friday from 9 AM to 3 PM/.  [employer] - [facility] Crisis Line  2 [phone] [phone], Option 1

## 2022-02-07 NOTE — BH INPATIENT PSYCHIATRY PROGRESS NOTE - NSTXDISORGDATEEST_PSY_ALL_CORE
26-Jan-2022

## 2022-02-07 NOTE — BH INPATIENT PSYCHIATRY PROGRESS NOTE - NSTXDISORGPROGRES_PSY_ALL_CORE
Met - goal discontinued
Statement Selected

## 2022-02-07 NOTE — BH INPATIENT PSYCHIATRY PROGRESS NOTE - NSBHASSESSSUMMFT_PSY_ALL_CORE
21/F with hx of bipolar disorder with PF, multiple past psych admissions including most recent [facility] discharge x 12 days ago, no reported hx of SA, and hx of alcohol/ cannabis abuse.  Today, presented to the ED BIB parents due to worsening agitated behavior in the context of a manic episode.    Family meeting conducted (30 mins), where treatment, expectations, disposition planning discussed. Patient remains discharge focused, able to accept staying this weekend with proposed discharge date 2/7/22. no overt manic symptoms present. will obtain Li level 2/6/22.     no CO indicated   c/w Seroquel 500mg qhs   c/w Lithium 300mg daily + 450mg qhs  Clonazepam 1mg qhs   haldol 5 / lorazepam 2 PRN for agitation   albuterol PRN for asthma   coordinate with s/w    21/F with hx of bipolar disorder with PF, multiple past psych admissions including most recent [facility] discharge x 12 days ago, no reported hx of SA, and hx of alcohol/ cannabis abuse.  Today, presented to the ED BIB parents due to worsening agitated behavior in the context of a manic episode.    Family meeting conducted (30 mins), where treatment, expectations, disposition planning discussed. Patient remains discharge focused, able to accept staying this weekend with proposed discharge date 2/7/22. no overt manic symptoms present. will obtain Li level 2/6/22.     The patient has continued to show improvement in symptoms.  She states her mood is much improved, stable, and she denies any anxiety.  She denies any SI, and her behavior has been well controlled.  The patient has been sleeping well, through the night.  The patient has been fully engaged in treatment on the unit, compliant with medications, and is looking forward to continuing care as an outpatient in [facility].  The patient has support from her family, who are also protective factors for her.  She was able to safety plan appropriately.  The patient’s risk cannot be further decreased with continued inpatient hospitalization.  She is no longer an acute danger to herself or others, and is stable for discharge home.    c/w Seroquel 500mg qhs   c/w Lithium 300mg daily + 450mg qhs (level 0.6)  Clonazepam 1mg qhs   haldol 5 / lorazepam 2 PRN for agitation   albuterol PRN for asthma   coordinate with s/w

## 2022-02-07 NOTE — BH INPATIENT PSYCHIATRY PROGRESS NOTE - NSBHFUPINTERVALCCFT_PSY_A_CORE
follow up mood

## 2022-02-07 NOTE — BH INPATIENT PSYCHIATRY PROGRESS NOTE - PRN MEDS
MEDICATIONS  (PRN):  acetaminophen     Tablet .. 650 milliGRAM(s) Oral every 6 hours PRN Mild Pain (1 - 3), Moderate Pain (4 - 6)  ALBUTerol    90 MICROgram(s) HFA Inhaler 2 Puff(s) Inhalation every 6 hours PRN Shortness of Breath and/or Wheezing  artificial  tears Solution 1 Drop(s) Both EYES four times a day PRN dry eys  BACItracin   Ointment 1 Application(s) Topical every 8 hours PRN wound care  diphenhydrAMINE 50 milliGRAM(s) Oral every 6 hours PRN anxiety, agitation  diphenhydrAMINE Injectable 50 milliGRAM(s) IntraMuscular once PRN severe anxiety, agitation  guaiFENesin Oral Liquid (Sugar-Free) 200 milliGRAM(s) Oral every 6 hours PRN cough  haloperidol     Tablet 5 milliGRAM(s) Oral every 6 hours PRN agitation, anxiety  haloperidol    Injectable 5 milliGRAM(s) IntraMuscular Once PRN severe agitation, severe anxiety  LORazepam     Tablet 2 milliGRAM(s) Oral every 6 hours PRN Anxiety  LORazepam   Injectable 2 milliGRAM(s) IntraMuscular once PRN agitation  nicotine  Polacrilex Gum 4 milliGRAM(s) Oral every 4 hours PRN Craving  sodium chloride 0.65% Nasal 1 Spray(s) Both Nostrils two times a day PRN Congestion  
MEDICATIONS  (PRN):  ALBUTerol    90 MICROgram(s) HFA Inhaler 2 Puff(s) Inhalation every 6 hours PRN Shortness of Breath and/or Wheezing  artificial  tears Solution 1 Drop(s) Both EYES four times a day PRN dry eys  BACItracin   Ointment 1 Application(s) Topical every 8 hours PRN wound care  diphenhydrAMINE 50 milliGRAM(s) Oral every 6 hours PRN anxiety, agitation  diphenhydrAMINE Injectable 50 milliGRAM(s) IntraMuscular once PRN severe anxiety, agitation  guaiFENesin Oral Liquid (Sugar-Free) 200 milliGRAM(s) Oral every 6 hours PRN cough  haloperidol     Tablet 5 milliGRAM(s) Oral every 6 hours PRN agitation, anxiety  haloperidol    Injectable 5 milliGRAM(s) IntraMuscular Once PRN severe agitation, severe anxiety  LORazepam     Tablet 2 milliGRAM(s) Oral every 6 hours PRN Anxiety  LORazepam   Injectable 2 milliGRAM(s) IntraMuscular Once PRN severe anxiety  nicotine  Polacrilex Gum 4 milliGRAM(s) Oral every 4 hours PRN Craving  sodium chloride 0.65% Nasal 1 Spray(s) Both Nostrils two times a day PRN Congestion  
MEDICATIONS  (PRN):  ALBUTerol    90 MICROgram(s) HFA Inhaler 2 Puff(s) Inhalation every 6 hours PRN Shortness of Breath and/or Wheezing  artificial  tears Solution 1 Drop(s) Both EYES four times a day PRN dry eys  BACItracin   Ointment 1 Application(s) Topical every 8 hours PRN wound care  diphenhydrAMINE 50 milliGRAM(s) Oral every 6 hours PRN anxiety, agitation  diphenhydrAMINE Injectable 50 milliGRAM(s) IntraMuscular once PRN severe anxiety, agitation  guaiFENesin Oral Liquid (Sugar-Free) 200 milliGRAM(s) Oral every 6 hours PRN cough  haloperidol     Tablet 5 milliGRAM(s) Oral every 6 hours PRN agitation, anxiety  haloperidol    Injectable 5 milliGRAM(s) IntraMuscular Once PRN severe agitation, severe anxiety  LORazepam     Tablet 2 milliGRAM(s) Oral every 6 hours PRN Anxiety  LORazepam   Injectable 2 milliGRAM(s) IntraMuscular Once PRN severe anxiety  nicotine  Polacrilex Gum 4 milliGRAM(s) Oral every 4 hours PRN Craving  sodium chloride 0.65% Nasal 1 Spray(s) Both Nostrils two times a day PRN Congestion  
MEDICATIONS  (PRN):  acetaminophen     Tablet .. 650 milliGRAM(s) Oral every 6 hours PRN Mild Pain (1 - 3), Moderate Pain (4 - 6)  ALBUTerol    90 MICROgram(s) HFA Inhaler 2 Puff(s) Inhalation every 6 hours PRN Shortness of Breath and/or Wheezing  artificial  tears Solution 1 Drop(s) Both EYES four times a day PRN dry eys  BACItracin   Ointment 1 Application(s) Topical every 8 hours PRN wound care  diphenhydrAMINE 50 milliGRAM(s) Oral every 6 hours PRN anxiety, agitation  diphenhydrAMINE Injectable 50 milliGRAM(s) IntraMuscular once PRN severe anxiety, agitation  guaiFENesin Oral Liquid (Sugar-Free) 200 milliGRAM(s) Oral every 6 hours PRN cough  haloperidol     Tablet 5 milliGRAM(s) Oral every 6 hours PRN agitation, anxiety  haloperidol    Injectable 5 milliGRAM(s) IntraMuscular Once PRN severe agitation, severe anxiety  LORazepam     Tablet 2 milliGRAM(s) Oral every 6 hours PRN Anxiety  nicotine  Polacrilex Gum 4 milliGRAM(s) Oral every 4 hours PRN Craving  sodium chloride 0.65% Nasal 1 Spray(s) Both Nostrils two times a day PRN Congestion  
MEDICATIONS  (PRN):  acetaminophen     Tablet .. 650 milliGRAM(s) Oral every 6 hours PRN Mild Pain (1 - 3), Moderate Pain (4 - 6)  ALBUTerol    90 MICROgram(s) HFA Inhaler 2 Puff(s) Inhalation every 6 hours PRN Shortness of Breath and/or Wheezing  artificial  tears Solution 1 Drop(s) Both EYES four times a day PRN dry eys  BACItracin   Ointment 1 Application(s) Topical every 8 hours PRN wound care  diphenhydrAMINE 50 milliGRAM(s) Oral every 6 hours PRN anxiety, agitation  diphenhydrAMINE Injectable 50 milliGRAM(s) IntraMuscular once PRN severe anxiety, agitation  guaiFENesin Oral Liquid (Sugar-Free) 200 milliGRAM(s) Oral every 6 hours PRN cough  haloperidol     Tablet 5 milliGRAM(s) Oral every 6 hours PRN agitation, anxiety  haloperidol    Injectable 5 milliGRAM(s) IntraMuscular Once PRN severe agitation, severe anxiety  LORazepam     Tablet 2 milliGRAM(s) Oral every 6 hours PRN Anxiety  nicotine  Polacrilex Gum 4 milliGRAM(s) Oral every 4 hours PRN Craving  sodium chloride 0.65% Nasal 1 Spray(s) Both Nostrils two times a day PRN Congestion  
MEDICATIONS  (PRN):  ALBUTerol    90 MICROgram(s) HFA Inhaler 2 Puff(s) Inhalation every 6 hours PRN Shortness of Breath and/or Wheezing  artificial  tears Solution 1 Drop(s) Both EYES four times a day PRN dry eys  BACItracin   Ointment 1 Application(s) Topical every 8 hours PRN wound care  diphenhydrAMINE 50 milliGRAM(s) Oral every 6 hours PRN anxiety, agitation  diphenhydrAMINE Injectable 50 milliGRAM(s) IntraMuscular once PRN severe anxiety, agitation  guaiFENesin Oral Liquid (Sugar-Free) 200 milliGRAM(s) Oral every 6 hours PRN cough  haloperidol     Tablet 5 milliGRAM(s) Oral every 6 hours PRN agitation, anxiety  haloperidol    Injectable 5 milliGRAM(s) IntraMuscular Once PRN severe agitation, severe anxiety  LORazepam     Tablet 2 milliGRAM(s) Oral every 6 hours PRN Anxiety  LORazepam   Injectable 2 milliGRAM(s) IntraMuscular Once PRN severe anxiety  nicotine  Polacrilex Gum 4 milliGRAM(s) Oral every 4 hours PRN Craving  sodium chloride 0.65% Nasal 1 Spray(s) Both Nostrils two times a day PRN Congestion  
MEDICATIONS  (PRN):  ALBUTerol    90 MICROgram(s) HFA Inhaler 2 Puff(s) Inhalation every 6 hours PRN Shortness of Breath and/or Wheezing  artificial  tears Solution 1 Drop(s) Both EYES four times a day PRN dry eys  BACItracin   Ointment 1 Application(s) Topical every 8 hours PRN wound care  diphenhydrAMINE 50 milliGRAM(s) Oral every 6 hours PRN anxiety, agitation  diphenhydrAMINE Injectable 50 milliGRAM(s) IntraMuscular once PRN severe anxiety, agitation  guaiFENesin Oral Liquid (Sugar-Free) 200 milliGRAM(s) Oral every 6 hours PRN cough  haloperidol     Tablet 5 milliGRAM(s) Oral every 6 hours PRN agitation, anxiety  haloperidol    Injectable 5 milliGRAM(s) IntraMuscular Once PRN severe agitation, severe anxiety  LORazepam     Tablet 2 milliGRAM(s) Oral every 6 hours PRN Anxiety  LORazepam   Injectable 2 milliGRAM(s) IntraMuscular Once PRN severe anxiety  nicotine  Polacrilex Gum 4 milliGRAM(s) Oral every 4 hours PRN Craving  sodium chloride 0.65% Nasal 1 Spray(s) Both Nostrils two times a day PRN Congestion  
MEDICATIONS  (PRN):  ALBUTerol    90 MICROgram(s) HFA Inhaler 2 Puff(s) Inhalation every 6 hours PRN Shortness of Breath and/or Wheezing  artificial  tears Solution 1 Drop(s) Both EYES four times a day PRN dry eys  diphenhydrAMINE 50 milliGRAM(s) Oral every 6 hours PRN anxiety, agitation  diphenhydrAMINE Injectable 50 milliGRAM(s) IntraMuscular once PRN severe anxiety, agitation  guaiFENesin Oral Liquid (Sugar-Free) 200 milliGRAM(s) Oral every 6 hours PRN cough  haloperidol     Tablet 5 milliGRAM(s) Oral every 6 hours PRN agitation, anxiety  haloperidol    Injectable 5 milliGRAM(s) IntraMuscular Once PRN severe agitation, severe anxiety  LORazepam     Tablet 2 milliGRAM(s) Oral every 6 hours PRN Anxiety  LORazepam   Injectable 2 milliGRAM(s) IntraMuscular Once PRN severe anxiety  nicotine  Polacrilex Gum 4 milliGRAM(s) Oral every 4 hours PRN Craving  sodium chloride 0.65% Nasal 1 Spray(s) Both Nostrils two times a day PRN Congestion  
MEDICATIONS  (PRN):  acetaminophen     Tablet .. 650 milliGRAM(s) Oral every 6 hours PRN Mild Pain (1 - 3), Moderate Pain (4 - 6)  ALBUTerol    90 MICROgram(s) HFA Inhaler 2 Puff(s) Inhalation every 6 hours PRN Shortness of Breath and/or Wheezing  artificial  tears Solution 1 Drop(s) Both EYES four times a day PRN dry eys  BACItracin   Ointment 1 Application(s) Topical every 8 hours PRN wound care  diphenhydrAMINE 50 milliGRAM(s) Oral every 6 hours PRN anxiety, agitation  diphenhydrAMINE Injectable 50 milliGRAM(s) IntraMuscular once PRN severe anxiety, agitation  guaiFENesin Oral Liquid (Sugar-Free) 200 milliGRAM(s) Oral every 6 hours PRN cough  haloperidol     Tablet 5 milliGRAM(s) Oral every 6 hours PRN agitation, anxiety  haloperidol    Injectable 5 milliGRAM(s) IntraMuscular Once PRN severe agitation, severe anxiety  LORazepam     Tablet 2 milliGRAM(s) Oral every 6 hours PRN Anxiety  nicotine  Polacrilex Gum 4 milliGRAM(s) Oral every 4 hours PRN Craving  sodium chloride 0.65% Nasal 1 Spray(s) Both Nostrils two times a day PRN Congestion  
MEDICATIONS  (PRN):  ALBUTerol    90 MICROgram(s) HFA Inhaler 2 Puff(s) Inhalation every 6 hours PRN Shortness of Breath and/or Wheezing  artificial  tears Solution 1 Drop(s) Both EYES four times a day PRN dry eys  diphenhydrAMINE 50 milliGRAM(s) Oral every 6 hours PRN anxiety, agitation  diphenhydrAMINE Injectable 50 milliGRAM(s) IntraMuscular once PRN severe anxiety, agitation  haloperidol     Tablet 5 milliGRAM(s) Oral every 6 hours PRN agitation, anxiety  haloperidol    Injectable 5 milliGRAM(s) IntraMuscular Once PRN severe agitation, severe anxiety  LORazepam     Tablet 2 milliGRAM(s) Oral every 6 hours PRN Anxiety  LORazepam   Injectable 2 milliGRAM(s) IntraMuscular Once PRN severe anxiety  nicotine  Polacrilex Gum 4 milliGRAM(s) Oral every 4 hours PRN Craving  sodium chloride 0.65% Nasal 1 Spray(s) Both Nostrils two times a day PRN Congestion

## 2022-02-07 NOTE — BH INPATIENT PSYCHIATRY PROGRESS NOTE - NSBHCHARTREVIEWVS_PSY_A_CORE FT
Vital Signs Last 24 Hrs  T(C): 36.7 (02-04-22 @ 08:36), Max: 37 (02-03-22 @ 18:10)  T(F): 98 (02-04-22 @ 08:36), Max: 98.6 (02-03-22 @ 18:10)  HR: 87 (02-04-22 @ 08:36) (87 - 87)  BP: 103/76 (02-04-22 @ 08:36) (103/76 - 103/76)  BP(mean): --  RR: --  SpO2: --    Orthostatic VS  02-03-22 @ 08:23  Lying BP: --/-- HR: --  Sitting BP: 101/57 HR: 89  Standing BP: --/-- HR: --  Site: --  Mode: --  
Vital Signs Last 24 Hrs  T(C): 36.9 (01-27-22 @ 08:39), Max: 36.9 (01-27-22 @ 08:39)  T(F): 98.5 (01-27-22 @ 08:39), Max: 98.5 (01-27-22 @ 08:39)  HR: --  BP: --  BP(mean): --  RR: 18 (01-26-22 @ 18:10) (18 - 18)  SpO2: 99% (01-26-22 @ 18:10) (99% - 99%)    Orthostatic VS  01-27-22 @ 08:39  Lying BP: --/-- HR: --  Sitting BP: 122/61 HR: 118  Standing BP: --/-- HR: --  Site: --  Mode: --  Orthostatic VS  01-26-22 @ 18:10  Lying BP: --/-- HR: --  Sitting BP: 137/96 HR: 95  Standing BP: 138/76 HR: 99  Site: upper right arm  Mode: --  Orthostatic VS  01-26-22 @ 07:44  Lying BP: --/-- HR: --  Sitting BP: 129/76 HR: 88  Standing BP: 132/86 HR: 98  Site: upper left arm  Mode: --  Orthostatic VS  01-25-22 @ 15:24  Lying BP: --/-- HR: --  Sitting BP: 138/81 HR: 100  Standing BP: 131/90 HR: 98  Site: --  Mode: --  
Vital Signs Last 24 Hrs  T(C): 36.9 (01-29-22 @ 07:58), Max: 37 (01-28-22 @ 18:44)  T(F): 98.5 (01-29-22 @ 07:58), Max: 98.6 (01-28-22 @ 18:44)  HR: --  BP: --  BP(mean): --  RR: --  SpO2: --    Orthostatic VS  01-29-22 @ 07:58  Lying BP: --/-- HR: --  Sitting BP: 128/78 HR: 103  Standing BP: --/-- HR: --  Site: --  Mode: --  Orthostatic VS  01-28-22 @ 08:25  Lying BP: --/-- HR: --  Sitting BP: 118/74 HR: 102  Standing BP: --/-- HR: --  Site: --  Mode: --  
Vital Signs Last 24 Hrs  T(C): 36.4 (02-01-22 @ 08:14), Max: 37.1 (01-31-22 @ 18:30)  T(F): 97.6 (02-01-22 @ 08:14), Max: 98.8 (01-31-22 @ 18:30)  HR: 99 (02-01-22 @ 08:14) (99 - 99)  BP: 120/67 (02-01-22 @ 08:14) (120/67 - 120/67)  BP(mean): --  RR: --  SpO2: --    
Vital Signs Last 24 Hrs  T(C): 36.7 (02-03-22 @ 08:23), Max: 36.7 (02-02-22 @ 19:17)  T(F): 98 (02-03-22 @ 08:23), Max: 98 (02-02-22 @ 19:17)  HR: --  BP: --  BP(mean): --  RR: --  SpO2: --    Orthostatic VS  02-03-22 @ 08:23  Lying BP: --/-- HR: --  Sitting BP: 101/57 HR: 89  Standing BP: --/-- HR: --  Site: --  Mode: --  Orthostatic VS  02-02-22 @ 06:56  Lying BP: --/-- HR: --  Sitting BP: 111/59 HR: 90  Standing BP: --/-- HR: --  Site: --  Mode: --  
Vital Signs Last 24 Hrs  T(C): 36.8 (02-07-22 @ 08:53), Max: 36.8 (02-07-22 @ 08:53)  T(F): 98.2 (02-07-22 @ 08:53), Max: 98.2 (02-07-22 @ 08:53)  HR: --  BP: --  BP(mean): --  RR: --  SpO2: --    Orthostatic VS  02-07-22 @ 08:53  Lying BP: --/-- HR: --  Sitting BP: 115/75 HR: 99  Standing BP: --/-- HR: --  Site: --  Mode: --  
Vital Signs Last 24 Hrs  T(C): 36.3 (02-02-22 @ 06:56), Max: 36.3 (02-02-22 @ 06:56)  T(F): 97.3 (02-02-22 @ 06:56), Max: 97.3 (02-02-22 @ 06:56)  HR: --  BP: --  BP(mean): --  RR: --  SpO2: --    Orthostatic VS  02-02-22 @ 06:56  Lying BP: --/-- HR: --  Sitting BP: 111/59 HR: 90  Standing BP: --/-- HR: --  Site: --  Mode: --  
Vital Signs Last 24 Hrs  T(C): 37 (01-28-22 @ 08:25), Max: 37 (01-28-22 @ 08:25)  T(F): 98.6 (01-28-22 @ 08:25), Max: 98.6 (01-28-22 @ 08:25)  HR: --  BP: --  BP(mean): --  RR: --  SpO2: --    Orthostatic VS  01-28-22 @ 08:25  Lying BP: --/-- HR: --  Sitting BP: 118/74 HR: 102  Standing BP: --/-- HR: --  Site: --  Mode: --  Orthostatic VS  01-27-22 @ 08:39  Lying BP: --/-- HR: --  Sitting BP: 122/61 HR: 118  Standing BP: --/-- HR: --  Site: --  Mode: --  Orthostatic VS  01-26-22 @ 18:10  Lying BP: --/-- HR: --  Sitting BP: 137/96 HR: 95  Standing BP: 138/76 HR: 99  Site: upper right arm  Mode: --  
Vital Signs Last 24 Hrs  T(C): 36.4 (01-31-22 @ 08:22), Max: 36.5 (01-30-22 @ 21:16)  T(F): 97.6 (01-31-22 @ 08:22), Max: 97.7 (01-30-22 @ 21:16)  HR: 76 (01-31-22 @ 08:22) (76 - 76)  BP: 131/60 (01-31-22 @ 08:22) (131/60 - 131/60)  BP(mean): --  RR: --  SpO2: --    Orthostatic VS  01-30-22 @ 08:52  Lying BP: --/-- HR: --  Sitting BP: 108/65 HR: 75  Standing BP: --/-- HR: --  Site: --  Mode: --  
Vital Signs Last 24 Hrs  T(C): 36.6 (01-30-22 @ 08:52), Max: 36.6 (01-30-22 @ 08:52)  T(F): 97.9 (01-30-22 @ 08:52), Max: 97.9 (01-30-22 @ 08:52)  HR: --  BP: --  BP(mean): --  RR: --  SpO2: --    Orthostatic VS  01-30-22 @ 08:52  Lying BP: --/-- HR: --  Sitting BP: 108/65 HR: 75  Standing BP: --/-- HR: --  Site: --  Mode: --  Orthostatic VS  01-29-22 @ 07:58  Lying BP: --/-- HR: --  Sitting BP: 128/78 HR: 103  Standing BP: --/-- HR: --  Site: --  Mode: --

## 2022-09-28 NOTE — ED BEHAVIORAL HEALTH ASSESSMENT NOTE - NSBHSAALC_PSY_A_CORE FT
Detail Level: Simple Additional Notes: Recommended to go see Dr. Adrien Scott Render Risk Assessment In Note?: no last use within the last week, rum, unknown amounts

## 2022-10-04 NOTE — ED ADULT TRIAGE NOTE - ADDITIONAL SAFETY/BANDS...
Addended by: ANT BRENNAN on: 10/4/2022 01:57 PM     Modules accepted: Orders    
Additional Safety/Bands:

## 2023-05-01 ENCOUNTER — EMERGENCY (EMERGENCY)
Facility: HOSPITAL | Age: 23
LOS: 1 days | Discharge: HOME | End: 2023-05-01
Attending: EMERGENCY MEDICINE
Payer: COMMERCIAL

## 2023-05-01 VITALS
HEIGHT: 62 IN | DIASTOLIC BLOOD PRESSURE: 79 MMHG | OXYGEN SATURATION: 100 % | SYSTOLIC BLOOD PRESSURE: 128 MMHG | RESPIRATION RATE: 18 BRPM | TEMPERATURE: 99 F | WEIGHT: 162.04 LBS | HEART RATE: 111 BPM

## 2023-05-01 DIAGNOSIS — Z98.890 OTHER SPECIFIED POSTPROCEDURAL STATES: Chronic | ICD-10-CM

## 2023-05-01 PROCEDURE — 99284 EMERGENCY DEPT VISIT MOD MDM: CPT

## 2023-05-01 NOTE — ED ADULT TRIAGE NOTE - CHIEF COMPLAINT QUOTE
patient endorsing rashing to hands, under feet, inner thighs and face since january. patient states "it might be related to my lithium". patient denies SOB, maintaining oral secretions in triage

## 2023-05-02 VITALS
OXYGEN SATURATION: 98 % | HEART RATE: 87 BPM | DIASTOLIC BLOOD PRESSURE: 80 MMHG | RESPIRATION RATE: 18 BRPM | TEMPERATURE: 99 F | SYSTOLIC BLOOD PRESSURE: 123 MMHG

## 2023-05-02 LAB
ALBUMIN SERPL ELPH-MCNC: 3.9 G/DL — SIGNIFICANT CHANGE UP (ref 3.3–5)
ALP SERPL-CCNC: 62 U/L — SIGNIFICANT CHANGE UP (ref 40–120)
ALT FLD-CCNC: 7 U/L — LOW (ref 10–45)
ANION GAP SERPL CALC-SCNC: 16 MMOL/L — SIGNIFICANT CHANGE UP (ref 5–17)
AST SERPL-CCNC: 22 U/L — SIGNIFICANT CHANGE UP (ref 10–40)
BASOPHILS # BLD AUTO: 0 K/UL — SIGNIFICANT CHANGE UP (ref 0–0.2)
BASOPHILS NFR BLD AUTO: 0 % — SIGNIFICANT CHANGE UP (ref 0–2)
BILIRUB SERPL-MCNC: 0.6 MG/DL — SIGNIFICANT CHANGE UP (ref 0.2–1.2)
BUN SERPL-MCNC: 11 MG/DL — SIGNIFICANT CHANGE UP (ref 7–23)
CALCIUM SERPL-MCNC: 9.6 MG/DL — SIGNIFICANT CHANGE UP (ref 8.4–10.5)
CHLORIDE SERPL-SCNC: 103 MMOL/L — SIGNIFICANT CHANGE UP (ref 96–108)
CO2 SERPL-SCNC: 17 MMOL/L — LOW (ref 22–31)
CREAT SERPL-MCNC: 0.69 MG/DL — SIGNIFICANT CHANGE UP (ref 0.5–1.3)
EGFR: 127 ML/MIN/1.73M2 — SIGNIFICANT CHANGE UP
EOSINOPHIL # BLD AUTO: 0 K/UL — SIGNIFICANT CHANGE UP (ref 0–0.5)
EOSINOPHIL NFR BLD AUTO: 0 % — SIGNIFICANT CHANGE UP (ref 0–6)
GIANT PLATELETS BLD QL SMEAR: PRESENT — SIGNIFICANT CHANGE UP
GLUCOSE SERPL-MCNC: 95 MG/DL — SIGNIFICANT CHANGE UP (ref 70–99)
HCG UR QL: NEGATIVE — SIGNIFICANT CHANGE UP
HCT VFR BLD CALC: 37.9 % — SIGNIFICANT CHANGE UP (ref 34.5–45)
HGB BLD-MCNC: 11.9 G/DL — SIGNIFICANT CHANGE UP (ref 11.5–15.5)
LITHIUM SERPL-MCNC: 0.4 MMOL/L — LOW (ref 0.6–1.2)
LYMPHOCYTES # BLD AUTO: 0.79 K/UL — LOW (ref 1–3.3)
LYMPHOCYTES # BLD AUTO: 23.9 % — SIGNIFICANT CHANGE UP (ref 13–44)
MAGNESIUM SERPL-MCNC: 1.8 MG/DL — SIGNIFICANT CHANGE UP (ref 1.6–2.6)
MANUAL SMEAR VERIFICATION: SIGNIFICANT CHANGE UP
MCHC RBC-ENTMCNC: 28.3 PG — SIGNIFICANT CHANGE UP (ref 27–34)
MCHC RBC-ENTMCNC: 31.4 GM/DL — LOW (ref 32–36)
MCV RBC AUTO: 90.2 FL — SIGNIFICANT CHANGE UP (ref 80–100)
MONOCYTES # BLD AUTO: 0.09 K/UL — SIGNIFICANT CHANGE UP (ref 0–0.9)
MONOCYTES NFR BLD AUTO: 2.7 % — SIGNIFICANT CHANGE UP (ref 2–14)
NEUTROPHILS # BLD AUTO: 2.43 K/UL — SIGNIFICANT CHANGE UP (ref 1.8–7.4)
NEUTROPHILS NFR BLD AUTO: 73.4 % — SIGNIFICANT CHANGE UP (ref 43–77)
PLAT MORPH BLD: NORMAL — SIGNIFICANT CHANGE UP
PLATELET # BLD AUTO: 331 K/UL — SIGNIFICANT CHANGE UP (ref 150–400)
POTASSIUM SERPL-MCNC: 4.3 MMOL/L — SIGNIFICANT CHANGE UP (ref 3.5–5.3)
POTASSIUM SERPL-SCNC: 4.3 MMOL/L — SIGNIFICANT CHANGE UP (ref 3.5–5.3)
PROCALCITONIN SERPL-MCNC: 0.12 NG/ML — HIGH (ref 0.02–0.1)
PROT SERPL-MCNC: 8.6 G/DL — HIGH (ref 6–8.3)
RAPID RVP RESULT: SIGNIFICANT CHANGE UP
RBC # BLD: 4.2 M/UL — SIGNIFICANT CHANGE UP (ref 3.8–5.2)
RBC # FLD: 12.9 % — SIGNIFICANT CHANGE UP (ref 10.3–14.5)
RBC BLD AUTO: NORMAL — SIGNIFICANT CHANGE UP
SARS-COV-2 RNA SPEC QL NAA+PROBE: SIGNIFICANT CHANGE UP
SODIUM SERPL-SCNC: 136 MMOL/L — SIGNIFICANT CHANGE UP (ref 135–145)
WBC # BLD: 3.31 K/UL — LOW (ref 3.8–10.5)
WBC # FLD AUTO: 3.31 K/UL — LOW (ref 3.8–10.5)

## 2023-05-02 PROCEDURE — 99284 EMERGENCY DEPT VISIT MOD MDM: CPT | Mod: 25

## 2023-05-02 PROCEDURE — 84145 PROCALCITONIN (PCT): CPT

## 2023-05-02 PROCEDURE — 81025 URINE PREGNANCY TEST: CPT

## 2023-05-02 PROCEDURE — 83735 ASSAY OF MAGNESIUM: CPT

## 2023-05-02 PROCEDURE — 85025 COMPLETE CBC W/AUTO DIFF WBC: CPT

## 2023-05-02 PROCEDURE — 71046 X-RAY EXAM CHEST 2 VIEWS: CPT

## 2023-05-02 PROCEDURE — 80178 ASSAY OF LITHIUM: CPT

## 2023-05-02 PROCEDURE — 71046 X-RAY EXAM CHEST 2 VIEWS: CPT | Mod: 26

## 2023-05-02 PROCEDURE — 0225U NFCT DS DNA&RNA 21 SARSCOV2: CPT

## 2023-05-02 PROCEDURE — 80053 COMPREHEN METABOLIC PANEL: CPT

## 2023-05-02 RX ORDER — ACETAMINOPHEN 500 MG
975 TABLET ORAL ONCE
Refills: 0 | Status: COMPLETED | OUTPATIENT
Start: 2023-05-02 | End: 2023-05-02

## 2023-05-02 RX ADMIN — Medication 975 MILLIGRAM(S): at 01:10

## 2023-05-02 NOTE — ED PROVIDER NOTE - PATIENT PORTAL LINK FT
You can access the FollowMyHealth Patient Portal offered by [facility] by registering at the following website: [email or web address]. By joining [employer]’s FollowMyHealth portal, you will also be able to view your health information using other applications (apps) compatible with our system.

## 2023-05-02 NOTE — ED PROVIDER NOTE - ATTENDING CONTRIBUTION TO CARE
------------ATTENDING NOTE------------  pt c/o 1 month of constant waxing/waning diffuse itchy rash w/ red swelling and occasional hives to body, diagnosed as morbilliform drug rash, ? related to her Lithium or NSAIDs, never angioedema/swelling of lips/tongue or sob/stridor/wheezing, has low grade fever now and also c/o nasal congestion/clear rhinorrhea, clear chest w/o distress, no superimposed skin infection, never rash to face, no joint swelling/redness, soft benign abd w/o mass/organomegaly, awaiting labs/imaging and close reassessments -->  - Josiah Dickey MD   --------------------------------------------------- ------------ATTENDING NOTE------------  pt c/o 1 month of constant waxing/waning diffuse itchy rash w/ red swelling and occasional hives to body, diagnosed as morbilliform drug rash, ? related to her Lithium or NSAIDs, never angioedema/swelling of lips/tongue or sob/stridor/wheezing, has low grade fever now and also c/o nasal congestion/clear rhinorrhea, clear chest w/o distress, no superimposed skin infection, never rash to face, no joint swelling/redness, soft benign abd w/o mass/organomegaly, awaiting labs/imaging and close reassessments --> labs overall wnl (pending RVP), no new/worse symptoms, nml VS at [place], in depth dw pt about ddx, tx, delaney, continued close outpt fu.  - Josiah Dickey MD   ---------------------------------------------------

## 2023-05-02 NOTE — ED ADULT NURSE NOTE - OBJECTIVE STATEMENT
22y Female presenting to ED via walk-in triage for 1 month of diffuse itchy rash w/ red swelling and occasional hives to body. Pt states its a drug rash from either lithium or nsaids. Pt also c/o nasal congestion. OTherwise A&OX,3 breathing spontaneously and unlabored on RA, VSS. IV placed, labs drawn and sent.

## 2023-05-02 NOTE — ED PROVIDER NOTE - NSFOLLOWUPINSTRUCTIONS_ED_ALL_ED_FT
See your Primary Doctor/Therapist and Dermatology (rapid referral) this week -- call to discuss.    Continue antihistamines as previously directed and avoid NSAIDs and Lithium as directed.    Use Acetaminophen as directed for pain/fever -- see medication warnings.    Seek immediate medical care for new/worsening symptoms/concerns.

## 2023-05-02 NOTE — ED PROVIDER NOTE - NSFOLLOWUPCLINICS_GEN_ALL_ED_FT
[facility]  Dermatology  [address], [address]  [place] [address]  Phone: [phone]  Fax:

## 2023-05-02 NOTE — ED PROVIDER NOTE - CHIEF COMPLAINT
The patient is a 22y Female complaining of rash. Post-Care Instructions: I reviewed with the patient in detail post-care instructions. Patient is not to engage in any heavy lifting, exercise, or swimming for the next 14 days. Should the patient develop any fevers, chills, bleeding, severe pain patient will contact the office immediately.

## 2023-05-02 NOTE — ED ADULT NURSE NOTE - CHIEF COMPLAINT QUOTE
Let pt know that diabetes test is stable. Borderline. Other labs ok. For now continue current management and further discussion on follow up visit. Thanks. patient endorsing rashing to hands, under feet, inner thighs and face since january. patient states "it might be related to my lithium". patient denies SOB, maintaining oral secretions in triage

## 2023-05-02 NOTE — ED ADULT NURSE NOTE - NS ED NURSE LEVEL OF CONSCIOUSNESS ORIENTATION
Oriented - self; Oriented - place; Oriented - time
Coumadin/Warfarin - Dietary Advice.../Coumadin/Warfarin - Potential for adverse drug reactions and interactions/Coumadin/Warfarin - Compliance.../Coumadin/Warfarin - Follow-up monitoring...

## 2023-05-08 ENCOUNTER — APPOINTMENT (OUTPATIENT)
Dept: DERMATOLOGY | Facility: CLINIC | Age: 23
End: 2023-05-08

## 2023-07-10 NOTE — BH INPATIENT PSYCHIATRY PROGRESS NOTE - MSE UNSTRUCTURED FT
69 yo F with cough x 5 days  CXR out pt shows PNA  Plan for labs, abx, admit to Dr Jones The patient appears stated age, fair hygiene, dressed appropriately.  She was more cooperative with the interview.  Appropriate contact and irritable relatedness.  No PMA or PMR, no abnormal movements  Steady gait.  The patient’s speech was fluent, normal volume, rapid.  Mood is "fine". Affect is irritable, but more stable, not mood congruent. The patient’s thoughts are linear, goal directed. Denies tyshawn delusional content. Denies AVH. Denies SI/HI. Insight is limited.  Judgment is limited to fair.  Impulse control is fair.

## 2023-07-25 ENCOUNTER — APPOINTMENT (OUTPATIENT)
Dept: RHEUMATOLOGY | Facility: CLINIC | Age: 23
End: 2023-07-25

## 2023-10-06 ENCOUNTER — APPOINTMENT (OUTPATIENT)
Dept: OBGYN | Facility: CLINIC | Age: 23
End: 2023-10-06
Payer: COMMERCIAL

## 2023-10-06 VITALS
BODY MASS INDEX: 31.72 KG/M2 | WEIGHT: 168 LBS | HEIGHT: 61.02 IN | DIASTOLIC BLOOD PRESSURE: 73 MMHG | SYSTOLIC BLOOD PRESSURE: 110 MMHG | HEART RATE: 97 BPM

## 2023-10-06 DIAGNOSIS — D50.8 OTHER IRON DEFICIENCY ANEMIAS: ICD-10-CM

## 2023-10-06 DIAGNOSIS — Z78.9 OTHER SPECIFIED HEALTH STATUS: ICD-10-CM

## 2023-10-06 DIAGNOSIS — F31.9 BIPOLAR DISORDER, UNSPECIFIED: ICD-10-CM

## 2023-10-06 DIAGNOSIS — Z01.419 ENCOUNTER FOR GYNECOLOGICAL EXAMINATION (GENERAL) (ROUTINE) W/OUT ABNORMAL FINDINGS: ICD-10-CM

## 2023-10-06 DIAGNOSIS — B37.9 CANDIDIASIS, UNSPECIFIED: ICD-10-CM

## 2023-10-06 PROCEDURE — 99385 PREV VISIT NEW AGE 18-39: CPT

## 2023-10-06 RX ORDER — LITHIUM CARBONATE 300 MG/1
TABLET ORAL
Refills: 0 | Status: ACTIVE | COMMUNITY

## 2023-10-06 RX ORDER — BENZTROPINE MESYLATE 2 MG/1
2 TABLET ORAL
Refills: 0 | Status: ACTIVE | COMMUNITY

## 2023-10-06 RX ORDER — RISPERIDONE 0.5 MG/1
TABLET, FILM COATED ORAL
Refills: 0 | Status: ACTIVE | COMMUNITY

## 2023-10-06 RX ORDER — FLUCONAZOLE 150 MG/1
150 TABLET ORAL
Qty: 1 | Refills: 0 | Status: ACTIVE | COMMUNITY
Start: 2023-10-06 | End: 1900-01-01

## 2023-10-07 LAB
HBV SURFACE AG SER QL: NONREACTIVE
HCV AB SER QL: NONREACTIVE
HCV S/CO RATIO: 0.18 S/CO
HIV1+2 AB SPEC QL IA.RAPID: NONREACTIVE
T PALLIDUM AB SER QL IA: NEGATIVE

## 2023-10-09 LAB
C TRACH RRNA SPEC QL NAA+PROBE: NOT DETECTED
CANDIDA VAG CYTO: NOT DETECTED
G VAGINALIS+PREV SP MTYP VAG QL MICRO: NOT DETECTED
N GONORRHOEA RRNA SPEC QL NAA+PROBE: NOT DETECTED
SOURCE AMPLIFICATION: NORMAL
T VAGINALIS VAG QL WET PREP: NOT DETECTED

## 2023-10-18 ENCOUNTER — NON-APPOINTMENT (OUTPATIENT)
Age: 23
End: 2023-10-18

## 2023-10-18 LAB — CYTOLOGY CVX/VAG DOC THIN PREP: ABNORMAL

## 2024-01-01 NOTE — BH INPATIENT PSYCHIATRY ASSESSMENT NOTE - NSTXDISORGDATETRGT_PSY_ALL_CORE
Patient transferred to room 3007 via wheelchair, baby in mom's arms. Bands read and verified, report given to Soila VIDALES. Bedside safety check and safety time out performed. Pediatric Safety Transmitter #: [id number].    02-Feb-2022

## 2024-02-10 NOTE — BH INPATIENT PSYCHIATRY ASSESSMENT NOTE - NSTOBACCOSCREENHP_GEN_A_CS
PAST SURGICAL HISTORY:  AICD (Automatic Cardioverter/Defibrillator) Present inserted in Aug, 2008. Due for battery change in 1 month. ( [employer]) . Inserted by Dr Duffy    S/P cholecystectomy     Status post left hemicolectomy     
Yes

## 2024-02-29 NOTE — ED ADULT NURSE NOTE - NSICDXFAMILYHX_GEN_ALL_CORE_FT
FAMILY HISTORY:  Father  Still living? Yes, Estimated age: 61-70  Family history of diabetes mellitus in father, Age at diagnosis: Age Unknown    Mother  Still living? Yes, Estimated age: 61-70  Maternal family history of hypertension, Age at diagnosis: Age Unknown     Vaccine status unknown

## 2024-07-22 NOTE — BH TREATMENT PLAN - NSTXDEPRESDATETRGT_PSY_ALL_CORE
Spoke with patient Review prep instruction confirm date and time of procedure   Patient verbalized understanding     09-Dec-2021

## 2024-07-30 NOTE — BH TREATMENT PLAN - NSTXDISORGDATEEST_PSY_ALL_CORE
26-Jan-2022
Body Location Override (Optional - Billing Will Still Be Based On Selected Body Map Location If Applicable): left lower clavicle
Detail Level: Simple
Depth Of Biopsy: dermis
Was A Bandage Applied: Yes
Size Of Lesion In Cm: 1.1
X Size Of Lesion In Cm: 0
Biopsy Type: H and E
Biopsy Method: Personna blade
Anesthesia Type: 1% lidocaine with 1:200,000 epinephrine and a 1:10 solution of 8.4% sodium bicarbonate
Anesthesia Volume In Cc: 0.6
Hemostasis: Aluminum Chloride
Wound Care: Vaseline
Dressing: pressure dressing with telfa
Destruction After The Procedure: No
Type Of Destruction Used: Curettage
Curettage Text: The wound bed was treated with curettage after the biopsy was performed.
Cryotherapy Text: The wound bed was treated with cryotherapy after the biopsy was performed.
Electrodesiccation Text: The wound bed was treated with electrodesiccation after the biopsy was performed.
Electrodesiccation And Curettage Text: The wound bed was treated with electrodesiccation and curettage after the biopsy was performed.
Silver Nitrate Text: The wound bed was treated with silver nitrate after the biopsy was performed.
Lab: [id number]
Lab Facility: [id number]
Consent: The provider's intent is to obtain a tissue sample solely for diagnostic purposes. Written consent to obtain tissue sample was obtained and risks were reviewed including but not limited to scarring, infection, bleeding, scabbing, incomplete removal, nerve damage and allergy to anesthesia.
Post-Care Instructions: I reviewed with the patient in detail post-care instructions. Patient is to keep the biopsy site dry overnight, and then apply bacitracin twice daily until healed. Patient may apply hydrogen peroxide soaks to remove any crusting.
Notification Instructions: Patient will be notified of biopsy results. However, patient instructed to call the office if not contacted within 2 weeks.
Billing Type: Third-Party Bill
Information: Selecting Yes will display possible errors in your note based on the variables you have selected. This validation is only offered as a suggestion for you. PLEASE NOTE THAT THE VALIDATION TEXT WILL BE REMOVED WHEN YOU FINALIZE YOUR NOTE. IF YOU WANT TO FAX A PRELIMINARY NOTE YOU WILL NEED TO TOGGLE THIS TO 'NO' IF YOU DO NOT WANT IT IN YOUR FAXED NOTE.

## 2025-04-29 NOTE — ED BEHAVIORAL HEALTH ASSESSMENT NOTE - CURRENT PASSIVE IDEATION:
Echo 4/2024; Normal left and right ventricular size and systolic function; EF 56%. Not on any GDMT.    - ctm for signs of overload None known

## 2025-06-04 NOTE — BH INPATIENT PSYCHIATRY PROGRESS NOTE - NSBHMSEMOVE_PSY_A_CORE
Thinner compared to last exam. Patient inconsistent with urea and calcipotriene and clobetasol. Reviewed again hyperkeratosis likely 2/2 to psoriasis. Patient using urea cream as needed. When feet get bad, she uses it and once feet calm down she stops. Advised to apply urea 40% cream daily for maintenance. Would apply in the morning and cover with socks.    No abnormal movements

## 2025-09-17 ENCOUNTER — TRANSCRIPTION ENCOUNTER (OUTPATIENT)
Age: 25
End: 2025-09-17